# Patient Record
Sex: FEMALE | Race: WHITE | Employment: FULL TIME | URBAN - METROPOLITAN AREA
[De-identification: names, ages, dates, MRNs, and addresses within clinical notes are randomized per-mention and may not be internally consistent; named-entity substitution may affect disease eponyms.]

---

## 2017-01-11 ENCOUNTER — OFFICE VISIT (OUTPATIENT)
Dept: OBGYN | Facility: CLINIC | Age: 32
End: 2017-01-11
Attending: ADVANCED PRACTICE MIDWIFE
Payer: COMMERCIAL

## 2017-01-11 VITALS
HEIGHT: 62 IN | BODY MASS INDEX: 30.18 KG/M2 | SYSTOLIC BLOOD PRESSURE: 97 MMHG | WEIGHT: 164 LBS | DIASTOLIC BLOOD PRESSURE: 61 MMHG

## 2017-01-11 DIAGNOSIS — Z34.03 FIRST PREGNANCY, THIRD TRIMESTER: Primary | ICD-10-CM

## 2017-01-11 PROCEDURE — 99211 OFF/OP EST MAY X REQ PHY/QHP: CPT | Mod: ZF

## 2017-01-11 ASSESSMENT — PAIN SCALES - GENERAL: PAINLEVEL: NO PAIN (0)

## 2017-01-11 NOTE — PROGRESS NOTES
"Subjective:      31 year old  at 34w3d presentst for a routine prenatal appointment. Reports feeling well. + FM.   Occasional tightness and BH contractions, denies vaginal bleeding, LOF, or change in discharge.   Reviewed EOB labs with patient.  Encouraged to continue iron-rich diet.    Objective:  Filed Vitals:    17 1549   BP: 97/61   Height: 1.575 m (5' 2.01\")   Weight: 74.39 kg (164 lb)   , see ob flowsheet  Assessment/Plan     Encounter Diagnosis   Name Primary?     First pregnancy, third trimester Yes      ABO   Date Value Ref Range Status   2016 B  Final     RH(D)   Date Value Ref Range Status   2016  Pos  Final     ANTIBODY SCREEN   Date Value Ref Range Status   2016 Neg  Final     - Reviewed why/how to contact provider if headache/visual changes/RUQ or epigastric pain, decreased fetal movement, vaginal bleeding, leakage of fluid or more than 4 contractions in an hour.  - PTL s/s reviewed, when to call   - Reviewed GBS screening at 35-36 wks.  - Return to clinic in 2 weeks and prn if questions or concerns.   "

## 2017-01-11 NOTE — NURSING NOTE
Chief Complaint   Patient presents with     Prenatal Care   34.3 weeks  Contractions and pressure this week.

## 2017-01-11 NOTE — Clinical Note
"2017       RE: Evelyn Strauss  [address] [facility] [address]     Dear Colleague,    Thank you for referring your patient, Evelyn Strauss, to the [facility] at [facility]. Please see a copy of my visit note below.    Subjective:      31 year old  at 34w3d presentst for a routine prenatal appointment. Reports feeling well. + FM.   Occasional tightness and BH contractions, denies vaginal bleeding, LOF, or change in discharge.   Reviewed EOB labs with patient.  Encouraged to continue iron-rich diet.    Objective:  Filed Vitals:    17 1549   BP: 97/61   Height: 1.575 m (5' 2.01\")   Weight: 74.39 kg (164 lb)   , see ob flowsheet  Assessment/Plan     Encounter Diagnosis   Name Primary?     First pregnancy, third trimester Yes      ABO   Date Value Ref Range Status   2016 B  Final     RH(D)   Date Value Ref Range Status   2016  Pos  Final     ANTIBODY SCREEN   Date Value Ref Range Status   2016 Neg  Final     - Reviewed why/how to contact provider if headache/visual changes/RUQ or epigastric pain, decreased fetal movement, vaginal bleeding, leakage of fluid or more than 4 contractions in an hour.  - PTL s/s reviewed, when to call   - Reviewed GBS screening at 35-36 wks.  - Return to clinic in 2 weeks and prn if questions or concerns.     Again, thank you for allowing me to participate in the care of your patient.      Sincerely,    SOHAIL Kumar CNM      "

## 2017-01-23 ENCOUNTER — ANESTHESIA (OUTPATIENT)
Dept: OBGYN | Facility: CLINIC | Age: 32
End: 2017-01-23
Payer: COMMERCIAL

## 2017-01-23 ENCOUNTER — HOSPITAL ENCOUNTER (INPATIENT)
Facility: CLINIC | Age: 32
LOS: 2 days | Discharge: HOME HEALTH | End: 2017-01-25
Attending: ADVANCED PRACTICE MIDWIFE | Admitting: ADVANCED PRACTICE MIDWIFE
Payer: COMMERCIAL

## 2017-01-23 ENCOUNTER — ANESTHESIA EVENT (OUTPATIENT)
Dept: OBGYN | Facility: CLINIC | Age: 32
End: 2017-01-23
Payer: COMMERCIAL

## 2017-01-23 DIAGNOSIS — O92.79 LACTATION DISORDER, DELIVERED: ICD-10-CM

## 2017-01-23 PROBLEM — O42.919 PRETERM PREMATURE RUPTURE OF MEMBRANES (PPROM) WITH UNKNOWN ONSET OF LABOR: Status: ACTIVE | Noted: 2017-01-23

## 2017-01-23 LAB
ABO + RH BLD: NORMAL
ABO + RH BLD: NORMAL
SPECIMEN EXP DATE BLD: NORMAL
T PALLIDUM IGG+IGM SER QL: NEGATIVE

## 2017-01-23 PROCEDURE — 25800025 ZZH RX 258: Performed by: ADVANCED PRACTICE MIDWIFE

## 2017-01-23 PROCEDURE — 88307 TISSUE EXAM BY PATHOLOGIST: CPT | Mod: 26 | Performed by: ADVANCED PRACTICE MIDWIFE

## 2017-01-23 PROCEDURE — 86901 BLOOD TYPING SEROLOGIC RH(D): CPT | Performed by: ADVANCED PRACTICE MIDWIFE

## 2017-01-23 PROCEDURE — 00HU33Z INSERTION OF INFUSION DEVICE INTO SPINAL CANAL, PERCUTANEOUS APPROACH: ICD-10-PCS | Performed by: ANESTHESIOLOGY

## 2017-01-23 PROCEDURE — 3E0P7GC INTRODUCTION OF OTHER THERAPEUTIC SUBSTANCE INTO FEMALE REPRODUCTIVE, VIA NATURAL OR ARTIFICIAL OPENING: ICD-10-PCS | Performed by: ADVANCED PRACTICE MIDWIFE

## 2017-01-23 PROCEDURE — 36415 COLL VENOUS BLD VENIPUNCTURE: CPT | Performed by: ADVANCED PRACTICE MIDWIFE

## 2017-01-23 PROCEDURE — 25000125 ZZHC RX 250: Performed by: ANESTHESIOLOGY

## 2017-01-23 PROCEDURE — 25000125 ZZHC RX 250

## 2017-01-23 PROCEDURE — 25800025 ZZH RX 258: Performed by: ANESTHESIOLOGY

## 2017-01-23 PROCEDURE — 87653 STREP B DNA AMP PROBE: CPT | Performed by: ADVANCED PRACTICE MIDWIFE

## 2017-01-23 PROCEDURE — 88307 TISSUE EXAM BY PATHOLOGIST: CPT | Performed by: ADVANCED PRACTICE MIDWIFE

## 2017-01-23 PROCEDURE — 86900 BLOOD TYPING SEROLOGIC ABO: CPT | Performed by: ADVANCED PRACTICE MIDWIFE

## 2017-01-23 PROCEDURE — 12000032 ZZH R&B OB CRITICAL UMMC

## 2017-01-23 PROCEDURE — 99215 OFFICE O/P EST HI 40 MIN: CPT

## 2017-01-23 PROCEDURE — 25000125 ZZHC RX 250: Performed by: ADVANCED PRACTICE MIDWIFE

## 2017-01-23 PROCEDURE — 3E0R3CZ INTRODUCTION OF REGIONAL ANESTHETIC INTO SPINAL CANAL, PERCUTANEOUS APPROACH: ICD-10-PCS | Performed by: ANESTHESIOLOGY

## 2017-01-23 PROCEDURE — 86780 TREPONEMA PALLIDUM: CPT | Performed by: ADVANCED PRACTICE MIDWIFE

## 2017-01-23 PROCEDURE — 25000128 H RX IP 250 OP 636: Performed by: ADVANCED PRACTICE MIDWIFE

## 2017-01-23 PROCEDURE — 25900017 H RX MED GY IP 259 OP 259 PS 637: Performed by: ADVANCED PRACTICE MIDWIFE

## 2017-01-23 PROCEDURE — 40000977 ZZH STATISTIC ATTENDANCE AT DELIVERY

## 2017-01-23 RX ORDER — PENICILLIN G POTASSIUM 5000000 [IU]/1
5 INJECTION, POWDER, FOR SOLUTION INTRAMUSCULAR; INTRAVENOUS ONCE
Status: COMPLETED | OUTPATIENT
Start: 2017-01-23 | End: 2017-01-23

## 2017-01-23 RX ORDER — CARBOPROST TROMETHAMINE 250 UG/ML
250 INJECTION, SOLUTION INTRAMUSCULAR
Status: DISCONTINUED | OUTPATIENT
Start: 2017-01-23 | End: 2017-01-24

## 2017-01-23 RX ORDER — OXYTOCIN 10 [USP'U]/ML
10 INJECTION, SOLUTION INTRAMUSCULAR; INTRAVENOUS
Status: DISCONTINUED | OUTPATIENT
Start: 2017-01-23 | End: 2017-01-24

## 2017-01-23 RX ORDER — MISOPROSTOL 100 UG/1
25 TABLET ORAL
Status: DISCONTINUED | OUTPATIENT
Start: 2017-01-23 | End: 2017-01-23

## 2017-01-23 RX ORDER — ACETAMINOPHEN 325 MG/1
650 TABLET ORAL EVERY 4 HOURS PRN
Status: DISCONTINUED | OUTPATIENT
Start: 2017-01-23 | End: 2017-01-24

## 2017-01-23 RX ORDER — OXYTOCIN/0.9 % SODIUM CHLORIDE 30/500 ML
100-340 PLASTIC BAG, INJECTION (ML) INTRAVENOUS CONTINUOUS PRN
Status: COMPLETED | OUTPATIENT
Start: 2017-01-23 | End: 2017-01-23

## 2017-01-23 RX ORDER — EPHEDRINE SULFATE 50 MG/ML
5 INJECTION, SOLUTION INTRAMUSCULAR; INTRAVENOUS; SUBCUTANEOUS
Status: DISCONTINUED | OUTPATIENT
Start: 2017-01-23 | End: 2017-01-24

## 2017-01-23 RX ORDER — NALBUPHINE HYDROCHLORIDE 10 MG/ML
2.5-5 INJECTION, SOLUTION INTRAMUSCULAR; INTRAVENOUS; SUBCUTANEOUS EVERY 6 HOURS PRN
Status: DISCONTINUED | OUTPATIENT
Start: 2017-01-23 | End: 2017-01-24

## 2017-01-23 RX ORDER — OXYCODONE AND ACETAMINOPHEN 5; 325 MG/1; MG/1
1 TABLET ORAL
Status: DISCONTINUED | OUTPATIENT
Start: 2017-01-23 | End: 2017-01-24

## 2017-01-23 RX ORDER — OXYTOCIN/0.9 % SODIUM CHLORIDE 30/500 ML
1-24 PLASTIC BAG, INJECTION (ML) INTRAVENOUS CONTINUOUS
Status: DISCONTINUED | OUTPATIENT
Start: 2017-01-23 | End: 2017-01-24

## 2017-01-23 RX ORDER — TERBUTALINE SULFATE 1 MG/ML
0.25 INJECTION, SOLUTION SUBCUTANEOUS
Status: DISCONTINUED | OUTPATIENT
Start: 2017-01-23 | End: 2017-01-24

## 2017-01-23 RX ORDER — OXYTOCIN/0.9 % SODIUM CHLORIDE 30/500 ML
PLASTIC BAG, INJECTION (ML) INTRAVENOUS
Status: COMPLETED
Start: 2017-01-23 | End: 2017-01-23

## 2017-01-23 RX ORDER — NALOXONE HYDROCHLORIDE 0.4 MG/ML
.1-.4 INJECTION, SOLUTION INTRAMUSCULAR; INTRAVENOUS; SUBCUTANEOUS
Status: DISCONTINUED | OUTPATIENT
Start: 2017-01-23 | End: 2017-01-24

## 2017-01-23 RX ORDER — IBUPROFEN 800 MG/1
800 TABLET, FILM COATED ORAL
Status: COMPLETED | OUTPATIENT
Start: 2017-01-23 | End: 2017-01-24

## 2017-01-23 RX ORDER — OXYTOCIN 10 [USP'U]/ML
INJECTION, SOLUTION INTRAMUSCULAR; INTRAVENOUS
Status: DISCONTINUED
Start: 2017-01-23 | End: 2017-01-24 | Stop reason: WASHOUT

## 2017-01-23 RX ORDER — ONDANSETRON 2 MG/ML
4 INJECTION INTRAMUSCULAR; INTRAVENOUS EVERY 6 HOURS PRN
Status: DISCONTINUED | OUTPATIENT
Start: 2017-01-23 | End: 2017-01-24

## 2017-01-23 RX ORDER — LIDOCAINE HYDROCHLORIDE AND EPINEPHRINE 15; 5 MG/ML; UG/ML
INJECTION, SOLUTION EPIDURAL PRN
Status: DISCONTINUED | OUTPATIENT
Start: 2017-01-23 | End: 2017-01-24 | Stop reason: HOSPADM

## 2017-01-23 RX ORDER — SODIUM CHLORIDE, SODIUM LACTATE, POTASSIUM CHLORIDE, CALCIUM CHLORIDE 600; 310; 30; 20 MG/100ML; MG/100ML; MG/100ML; MG/100ML
INJECTION, SOLUTION INTRAVENOUS CONTINUOUS
Status: DISCONTINUED | OUTPATIENT
Start: 2017-01-23 | End: 2017-01-24

## 2017-01-23 RX ORDER — METHYLERGONOVINE MALEATE 0.2 MG/ML
200 INJECTION INTRAVENOUS
Status: DISCONTINUED | OUTPATIENT
Start: 2017-01-23 | End: 2017-01-24

## 2017-01-23 RX ADMIN — Medication 25 MCG: at 13:34

## 2017-01-23 RX ADMIN — OXYTOCIN-SODIUM CHLORIDE 0.9% IV SOLN 30 UNIT/500ML 2 MILLI-UNITS/MIN: 30-0.9/5 SOLUTION at 20:05

## 2017-01-23 RX ADMIN — Medication 10 ML: at 18:58

## 2017-01-23 RX ADMIN — OXYTOCIN-SODIUM CHLORIDE 0.9% IV SOLN 30 UNIT/500ML 2 MILLI-UNITS/MIN: 30-0.9/5 SOLUTION at 22:50

## 2017-01-23 RX ADMIN — SODIUM CHLORIDE, POTASSIUM CHLORIDE, SODIUM LACTATE AND CALCIUM CHLORIDE: 600; 310; 30; 20 INJECTION, SOLUTION INTRAVENOUS at 16:40

## 2017-01-23 RX ADMIN — Medication 2.5 MILLION UNITS: at 20:48

## 2017-01-23 RX ADMIN — SODIUM CHLORIDE, POTASSIUM CHLORIDE, SODIUM LACTATE AND CALCIUM CHLORIDE: 600; 310; 30; 20 INJECTION, SOLUTION INTRAVENOUS at 22:40

## 2017-01-23 RX ADMIN — OXYTOCIN-SODIUM CHLORIDE 0.9% IV SOLN 30 UNIT/500ML 340 ML/HR: 30-0.9/5 SOLUTION at 23:56

## 2017-01-23 RX ADMIN — SODIUM CHLORIDE, POTASSIUM CHLORIDE, SODIUM LACTATE AND CALCIUM CHLORIDE: 600; 310; 30; 20 INJECTION, SOLUTION INTRAVENOUS at 18:46

## 2017-01-23 RX ADMIN — PENICILLIN G POTASSIUM 5 MILLION UNITS: 5000000 POWDER, FOR SOLUTION INTRAMUSCULAR; INTRAPLEURAL; INTRATHECAL; INTRAVENOUS at 16:40

## 2017-01-23 RX ADMIN — Medication 25 MCG: at 16:07

## 2017-01-23 RX ADMIN — SODIUM CHLORIDE, POTASSIUM CHLORIDE, SODIUM LACTATE AND CALCIUM CHLORIDE 1000 ML: 600; 310; 30; 20 INJECTION, SOLUTION INTRAVENOUS at 18:05

## 2017-01-23 RX ADMIN — LIDOCAINE HYDROCHLORIDE,EPINEPHRINE BITARTRATE 3 ML: 15; .005 INJECTION, SOLUTION EPIDURAL; INFILTRATION; INTRACAUDAL; PERINEURAL at 18:50

## 2017-01-23 NOTE — IP AVS SNAPSHOT
MRN:[id number]                      After Visit Summary   1/23/2017    Evelyn Strauss    MRN: [id number]           Thank you!     Thank you for choosing [facility] for your care. Our goal is always to provide you with excellent care. Hearing back from our patients is one way we can continue to improve our services. Please take a few minutes to complete the written survey that you may receive in the mail after you visit with us. Thank you!        Patient Information     Date Of Birth          1985        About your hospital stay     You were admitted on:  January 23, 2017 [facility]    You were discharged on:  January 25, 2017       Who to Call     For medical emergencies, please call 911.  For non-urgent questions about your medical care, please call your primary care provider or clinic, None          Attending Provider     Provider    [place]ar[place], SOHAIL Sandoval CNM, Karen Denise, APRN CNM       Primary Care Provider Fax #    [facility] [phone]       [address] [facility] [address]        Your next 10 appointments already scheduled     Mar 06, 2017 11:00 AM   RETURN EXTENDED with SOHAIL Purdy CNM   [facility] ([facility])    [facility] [address]  [address] [facility] [address] [phone]              Further instructions from your care team       Postpartum Vaginal Delivery Instructions    Activity       Ask family and friends for help when you need it.    Do not place anything in your vagina for 6 weeks.    You are not restricted on other activities, but take it easy for a few weeks to allow your body to recover from delivery.  You are able to do any activities you feel up to that point.    No driving until you have stopped taking your pain medications (usually two weeks after delivery).     Call your health care provider if you have  any of these symptoms:       Increased pain, swelling, redness, or fluid around your stiches from an episiotomy or perineal tear.    A fever above 100.4 F (38 C) with or without chills when placing a thermometer under your tongue.    You soak a sanitary pad with blood within 1 hour, or you see blood clots larger than a golf ball.    Bleeding that lasts more than 6 weeks.    Vaginal discharge that smells bad.    Severe pain, cramping or tenderness in your lower belly area.    A need to urinate more frequently (use the toilet more often), more urgently (use the toilet very quickly), or it burns when you urinate.    Nausea and vomiting.    Redness, swelling or pain around a vein in your leg.    Problems breastfeeding or a red or painful area on your breast.    Chest pain and cough or are gasping for air.    Problems coping with sadness, anxiety, or depression.  If you have any concerns about hurting yourself or the baby, call your provider immediately.     You have questions or concerns after you return home.     Keep your hands clean:  Always wash your hands before touching your perineal area and stitches.  This helps reduce your risk of infection.  If your hands aren't dirty, you may use an alcohol hand-rub to clean your hands. Keep your nails clean and short.        Pending Results     Date and Time Order Name Status Description    1/24/2017 0101 Placenta path order and indications In process             Admission Information        Provider Department Dept Phone    1/23/2017 SOHAIL Batista CNM [facility] [phone]      Your Vitals Were     Blood Pressure Pulse Temperature Respirations Pulse Oximetry Last Period    104/76 mmHg 78 97.9  F (36.6  C) (Oral) 14 97% 05/15/2016      [employer] Information     [employer] gives you secure access to your electronic health record. If you see a primary care provider, you can also send messages to your care team and make appointments. If you have questions, please call your  primary care clinic.  If you do not have a primary care provider, please call [phone] and they will assist you.        Care EveryWhere ID     This is your Care EveryWhere ID. This could be used by other organizations to access your [facility] medical records  [id number]           Review of your medicines      UNREVIEWED medicines. Ask your doctor about these medicines        Dose / Directions    PRENATAL DHA PO   Used for:  First trimester pregnancy        Dose:  1 tablet   Take 1 tablet by mouth daily   Refills:  0         START taking        Dose / Directions    breast pump Misc   Used for:  Lactation disorder, delivered        Dose:  1 each   1 each daily as needed   Quantity:  1 each   Refills:  0       docusate sodium 100 MG tablet   Commonly known as:  COLACE   Used for:  Vaginal delivery        Dose:  100 mg   Take 100 mg by mouth daily   Quantity:  100 tablet   Refills:  1       ferrous fumarate 65 mg ([place]. FE)-Vitamin C 125 mg  MG Tabs tablet   Commonly known as:  VITRON C   Used for:  Postpartum anemia        Dose:  1 tablet   Take 1 tablet by mouth daily   Quantity:  100 tablet   Refills:  1         CONTINUE these medicines which have NOT CHANGED        Dose / Directions    VITAMIN D (CHOLECALCIFEROL) PO        Take by mouth daily   Refills:  0            Where to get your medicines      These medications were sent to [id number] - [place], [place] - [address] [place] [address]     Phone:  [phone]    - docusate sodium 100 MG tablet  - ferrous fumarate 65 mg ([place]. FE)-Vitamin C 125 mg  MG Tabs tablet      Some of these will need a paper prescription and others can be bought over the counter. Ask your nurse if you have questions.     Bring a paper prescription for each of these medications    - breast pump Misc             Protect others around you: Learn how to safely use, store and throw away your medicines at www.disposemymeds.org.              Medication List: This is a list of all your medications and when to take them. Check marks below indicate your daily home schedule. Keep this list as a reference.      Medications           Morning Afternoon Evening Bedtime As Needed    breast pump Misc   1 each daily as needed                                docusate sodium 100 MG tablet   Commonly known as:  COLACE   Take 100 mg by mouth daily                                ferrous fumarate 65 mg ([place]. FE)-Vitamin C 125 mg  MG Tabs tablet   Commonly known as:  VITRON C   Take 1 tablet by mouth daily                                PRENATAL DHA PO   Take 1 tablet by mouth daily                                VITAMIN D (CHOLECALCIFEROL) PO   Take by mouth daily

## 2017-01-23 NOTE — H&P
"ADMIT NOTE  =================  36w1d    Evelyn Strauss is a 31 year old female with an Patient's last menstrual period was 05/15/2016. and Estimated Date of Delivery: 2017 [facility] at 12:00 PM with with PPROM without labor.  PPROM at 0615am today    HPI  ================    Contractions- mild  Fetal movement- active  ROM- yes, copious, clear and pink-tinged   Vaginal bleeding- pink  GBS- not done  FOB- is involved, at bedside  Other labor support-     Weight gain- 164 - 125 lbs, Total weight gain- 39 lbs  Height- 62\"  BMI- 22.9  First prenatal visit at 9 weeks, Total visits- 8    PROBLEM LIST  =================  Patient Active Problem List    Diagnosis Date Noted      premature rupture of membranes (PPROM) with unknown onset of labor 2017     Priority: Medium     Anemia during pregnancy in third trimester 2016     Priority: Medium     2016 - 10.4 with 3rd trimester labs, re screened today after increasing iron rich foods - up to 11.2.  Continue iron rich diet.  Recheck on admit to l&D       First pregnancy 2016     Priority: Medium     , MERY 17- parvo IgG pos, IgM neg  Varicella IgG pos       Vitamin D deficiency RESOLVED 2016     Priority: Medium     Resolved   16- Vit d 19, start 5000IU qd         HISTORIES  ============  No Known Allergies  Past Medical History   Diagnosis Date     NO ACTIVE PROBLEMS      Past Surgical History   Procedure Laterality Date     Extraction(s) dental     .  Family History   Problem Relation Age of Onset     Hypertension Mother      DIABETES Maternal Grandmother      Alzheimer Disease Paternal Grandmother      Depression Mother      Depression Sister      Thyroid Disease Mother      Obesity Mother      Obesity Sister      Social History   Substance Use Topics     Smoking status: Never Smoker      Smokeless tobacco: Never Used     Alcohol Use: 0.0 oz/week     0 Standard drinks or " equivalent per week      Comment: 2 a month tryong to conceive soon. stopped in preganncy     Obstetric History       T0      TAB0   SAB0   E0   M0   L0       # Outcome Date GA Lbr Devan/2nd Weight Sex Delivery Anes PTL Lv   1 Current                    LABS:   ===========  Prenatal Labs:  Rhogam not indicated   Lab Results   Component Value Date    ABO B 2016    RH  Pos 2016    AS Neg 2016    HEPBANG Nonreactive 2016    TREPAB Negative 2016    HGB 11.2* 2016     Rubella immune  No results found for this basename: GBS  Other labs:  No results found for this or any previous visit (from the past 24 hour(s)).    ROS  =========  Pt denies significant respiratory, cardiovacular, GI, or muscular/skeletalcomplaints.    See RN data base ROS.       PHYSICAL EXAM:  ===============  /63 mmHg  Temp(Src) 98  F (36.7  C) (Oral)  Resp 16  LMP 05/15/2016  General appearance: comfortable  GENERAL APPEARANCE: healthy, alert and no distress  RESP: lungs clear to auscultation - no rales, rhonchi or wheezes  BREAST: normal without masses, tenderness or nipple discharge and no palpable axillary masses or adenopathy  CV: regular rates and rhythm, normal S1 S2, no S3 or S4 and no murmur,and no varicosities  ABDOMEN:  soft, nontender, no epigastric pain  SKIN: no suspicious lesions or rashes  NEURO: Denies headache, blurred vision, other vision changes  PSYCH: mentation appears normal. and affect normal/bright  Legs: Reflexes normal bilaterally     Abdomen: gravid, vertex fetus per Leopold's, non-tender between contractions.   Cephalic presentation confirmed by BSUS  EFW-  6 lbs.   CONTACTIONS: mild and every 8-12 minutes  FETAL HEART TONES: continuous EFM- baseline 130 with moderate variability and positive accelerations. No decelerations.  PELVIC EXAM: 1/ 50%/ Posterior/ soft/ -1 . GBS swab obtained  PAVON SCORE: 5  BLOODY SHOW: no   ROM:yes, copious, clear  FLUID: clear  AMNISURE:  not done    ASSESSMENT:  ==============  IUP @ 36w1d admitted PPROM without labor   NST NOT DONE  Fetal Heart Rate - category one  GBS- pending     PLAN:  ===========  Admit - see IP orders  cervical ripening with Misoprostol oral reviewed with pt. Agreeable to plan, will start with one dose.  Labor induction with Pitocin reviewed with pt. Agreeable to plan, will start pitocin after 1-2 miso  Prophylactic IV antibiotic for positive GBS status reviewed with pt. Agreeable to PCN.  MD consultant on call Geronimo/ brianna felixn  Ambulation, hydration, position changes, birthing ball and tub options to facilitate labor reviewed with pt .  Ruby Novak, APRN CNM

## 2017-01-23 NOTE — IP AVS SNAPSHOT
[facility]    [address] [facility] [address]    Phone:  [phone]                                       After Visit Summary   1/23/2017    Evelyn Strauss    MRN: [id number]           After Visit Summary Signature Page     I have received my discharge instructions, and my questions have been answered. I have discussed any challenges I see with this plan with the nurse or doctor.    ..........................................................................................................................................  Patient/Patient Representative Signature      ..........................................................................................................................................  Patient Representative Print Name and Relationship to Patient    ..................................................               ................................................  Date                                            Time    ..........................................................................................................................................  Reviewed by Signature/Title    ...................................................              ..............................................  Date                                                            Time

## 2017-01-24 LAB
GP B STREP DNA SPEC QL NAA+PROBE: NORMAL
SPECIMEN SOURCE: NORMAL

## 2017-01-24 PROCEDURE — 0KQM0ZZ REPAIR PERINEUM MUSCLE, OPEN APPROACH: ICD-10-PCS | Performed by: ADVANCED PRACTICE MIDWIFE

## 2017-01-24 PROCEDURE — 25000132 ZZH RX MED GY IP 250 OP 250 PS 637: Performed by: ADVANCED PRACTICE MIDWIFE

## 2017-01-24 PROCEDURE — 12000030 ZZH R&B OB INTERMEDIATE UMMC

## 2017-01-24 PROCEDURE — 72200001 ZZH LABOR CARE VAGINAL DELIVERY SINGLE

## 2017-01-24 PROCEDURE — 0UQMXZZ REPAIR VULVA, EXTERNAL APPROACH: ICD-10-PCS | Performed by: ADVANCED PRACTICE MIDWIFE

## 2017-01-24 PROCEDURE — 25000125 ZZHC RX 250: Performed by: ADVANCED PRACTICE MIDWIFE

## 2017-01-24 RX ORDER — HYDROCORTISONE 2.5 %
CREAM (GRAM) TOPICAL 3 TIMES DAILY PRN
Status: DISCONTINUED | OUTPATIENT
Start: 2017-01-24 | End: 2017-01-25 | Stop reason: HOSPADM

## 2017-01-24 RX ORDER — OXYTOCIN 10 [USP'U]/ML
10 INJECTION, SOLUTION INTRAMUSCULAR; INTRAVENOUS
Status: DISCONTINUED | OUTPATIENT
Start: 2017-01-24 | End: 2017-01-25 | Stop reason: HOSPADM

## 2017-01-24 RX ORDER — OXYTOCIN/0.9 % SODIUM CHLORIDE 30/500 ML
340 PLASTIC BAG, INJECTION (ML) INTRAVENOUS CONTINUOUS PRN
Status: DISCONTINUED | OUTPATIENT
Start: 2017-01-24 | End: 2017-01-25 | Stop reason: HOSPADM

## 2017-01-24 RX ORDER — NALOXONE HYDROCHLORIDE 0.4 MG/ML
.1-.4 INJECTION, SOLUTION INTRAMUSCULAR; INTRAVENOUS; SUBCUTANEOUS
Status: DISCONTINUED | OUTPATIENT
Start: 2017-01-24 | End: 2017-01-25 | Stop reason: HOSPADM

## 2017-01-24 RX ORDER — ACETAMINOPHEN 325 MG/1
650 TABLET ORAL EVERY 4 HOURS PRN
Status: DISCONTINUED | OUTPATIENT
Start: 2017-01-24 | End: 2017-01-25 | Stop reason: HOSPADM

## 2017-01-24 RX ORDER — LANOLIN 100 %
OINTMENT (GRAM) TOPICAL
Status: DISCONTINUED | OUTPATIENT
Start: 2017-01-24 | End: 2017-01-25 | Stop reason: HOSPADM

## 2017-01-24 RX ORDER — BISACODYL 10 MG
10 SUPPOSITORY, RECTAL RECTAL DAILY PRN
Status: DISCONTINUED | OUTPATIENT
Start: 2017-01-26 | End: 2017-01-25 | Stop reason: HOSPADM

## 2017-01-24 RX ORDER — AMOXICILLIN 250 MG
1-2 CAPSULE ORAL 2 TIMES DAILY
Status: DISCONTINUED | OUTPATIENT
Start: 2017-01-24 | End: 2017-01-25 | Stop reason: HOSPADM

## 2017-01-24 RX ORDER — MISOPROSTOL 200 UG/1
400 TABLET ORAL
Status: DISCONTINUED | OUTPATIENT
Start: 2017-01-24 | End: 2017-01-25 | Stop reason: HOSPADM

## 2017-01-24 RX ORDER — OXYTOCIN/0.9 % SODIUM CHLORIDE 30/500 ML
100 PLASTIC BAG, INJECTION (ML) INTRAVENOUS CONTINUOUS
Status: DISCONTINUED | OUTPATIENT
Start: 2017-01-24 | End: 2017-01-25 | Stop reason: HOSPADM

## 2017-01-24 RX ORDER — IBUPROFEN 400 MG/1
400-800 TABLET, FILM COATED ORAL EVERY 6 HOURS PRN
Status: DISCONTINUED | OUTPATIENT
Start: 2017-01-24 | End: 2017-01-25 | Stop reason: HOSPADM

## 2017-01-24 RX ADMIN — IBUPROFEN 800 MG: 400 TABLET ORAL at 08:04

## 2017-01-24 RX ADMIN — LIDOCAINE HYDROCHLORIDE 10 ML: 10 INJECTION, SOLUTION INFILTRATION; PERINEURAL at 00:23

## 2017-01-24 RX ADMIN — SENNOSIDES AND DOCUSATE SODIUM 2 TABLET: 8.6; 5 TABLET ORAL at 08:05

## 2017-01-24 RX ADMIN — IBUPROFEN 800 MG: 400 TABLET ORAL at 21:55

## 2017-01-24 RX ADMIN — SENNOSIDES AND DOCUSATE SODIUM 2 TABLET: 8.6; 5 TABLET ORAL at 20:57

## 2017-01-24 RX ADMIN — IBUPROFEN 800 MG: 400 TABLET ORAL at 15:30

## 2017-01-24 RX ADMIN — IBUPROFEN 800 MG: 800 TABLET, FILM COATED ORAL at 00:57

## 2017-01-24 NOTE — PROGRESS NOTES
Labor progress note    S: CNM called to room to assess FHR tracing by RN.  Pt noted having some increase in pelvic pressure, had just given herself an PCEA bolus. FOB at bedside, supportive.  Reivewed with pt and FOB recommendation for SVE to assess labor progress given FHR category 2, consented.     O:  Blood pressure 108/59, pulse 98, temperature 98.5  F (36.9  C), temperature source Oral, resp. rate 16, last menstrual period 05/15/2016, SpO2 98 %.  General appearance: comfortable but feeling more rectal presure.  Contractions: Every 1-4 minutes. 60-90 seconds duration.  Palpate: moderate.  Soft resting tone.   FHT: Baseline 150 with moderate variability. Accelerations not present. + prolonged decelerations present - resolve with maternal reposition, 02@10L, Pitocin off, LR bolus.  ROM: clear fluid. Membranes have been ruptured for 15 hours.  Pelvic exam: 9.5/ 100%/ Anterior/ soft/ +1.   Moderate bloody show on peripad.       Pitocin- none,  Antibiotics- PCN  Epidural in situ  LR bolus  02@10L via facemask    A:  31 year old  with IUP @ 36w1d PPROM <18 hours, now active labor   Fetal Heart rate tracing Category category two  GBS- unknown.   Patient Active Problem List   Diagnosis     First pregnancy     Vitamin D deficiency RESOLVED     Anemia during pregnancy in third trimester      premature rupture of membranes (PPROM) with unknown onset of labor         P:  - ST now, room prepared for delivery.   - Maternal position to facilitate fetal rotation/descent and maximize fetal oxygenation.    - Reviewed second stage with pt and FOB.  Reviewed NICU prn based on initial  assessment   - Anticipate   - MD consultant on call French/ available prn       Gris SAUCEDA CNM

## 2017-01-24 NOTE — PROGRESS NOTES
Post Partum Note  SIGNIFICANT PROBLEMS:  Patient Active Problem List    Diagnosis Date Noted     Vaginal delivery 2017     Priority: Medium      premature rupture of membranes (PPROM) with unknown onset of labor 2017     Priority: Medium     Anemia during pregnancy in third trimester 2016     Priority: Medium     2016 - 10.4 with 3rd trimester labs, re screened today after increasing iron rich foods - up to 11.2.  Continue iron rich diet.  Recheck on admit to l&D       First pregnancy 2016     Priority: Medium     , MERY 17- parvo IgG pos, IgM neg  Varicella IgG pos       Vitamin D deficiency RESOLVED 2016     Priority: Medium     Resolved   16- Vit d 19, start 5000IU qd         INTERVAL HISTORY:  BP 92/66 mmHg  Pulse 82  Temp(Src) 98.5  F (36.9  C) (Oral)  Resp 14  SpO2 97%  LMP 05/15/2016  Breastfeeding? Unknown  Pt stable, baby is rooming in. Baby girl, have not yet picked name.  Breast feeding status:initiated; baby sleepy today but latching   Complications since 2 hours post delivery: None  Patient is tolerating activity well, Voiding without difficulty, cramping is minimal, lochia is decreasing and patient denies clots.  Perineal pain is is minimal.  The perineum laceration is well approximated    Postpartum hemoglobin   HEMOGLOBIN   Date Value Ref Range Status   2016 11.2* 11.7 - 15.7 g/dL Final     Blood type ABO        B   2017  RH      Pos   2017  Rubella status Immune    ASSESSMENT/PLAN:    Normal postpartum exam   Breasts: soft, nontender  Nipples:intact, without lesion  Fundus: Firm @ umbilicus, midline, nontender  Abdomen: soft, +bs  Perineum: 2nd degree laceration and bilateral lab lac healing, well approximated, no edema, bruising or hematoma  Lochia: moderate rubra, no clots  Legs: trace edema    Normal postpartum exam , Stable Post-partum day #1  Complications:none    Postpartum warning s/s reviewed, including  bleeding/clots, fever, mastitis, or depression, Kegels/ crunches  Continue prenatal vitamins.  Birthcontrol planned:Condoms    Declines any discharge medication.  Plan d/c home tomorrow. Home Visit Ordered- N/A  RTC 6 weeks    Current Discharge Medication List      CONTINUE these medications which have NOT CHANGED    Details   VITAMIN D, CHOLECALCIFEROL, PO Take by mouth daily      Docosahexaenoic Acid (PRENATAL DHA PO) Take 1 tablet by mouth daily    Associated Diagnoses: First trimester pregnancy           SOHAIL Brooks, JASWANTM

## 2017-01-24 NOTE — L&D DELIVERY NOTE
Evelyn Strauss is a 31 year old   at  36w1d presented to labor floor with c/o PPROM for clear at 0615.   Agreed to and received induction of labor with PO Misoprostol 25mcg PO x2 doses after admission SVE /-2.  Agreed to and received PCN for unknown GBS.  Requested and received epidural anesthesia at 1830.   Category 2 FHR tracing during day with moderate variability throughout.  Had prolonged deceleration and SVE found Alip/C/+1.   Straight cath by RN for 500ml and pt found to be C/C/+2 at .    R lateral rest with peanut ball after attempt at pushing.  Resumed pushing at  alternating between R lateral and tug of war positioning.    Pushed effectively with CNM coaching. FHR with variable and late decels with pushing effort, periods of tachycardia noted near end of second stage, periods of minimal variability.  No maternal tachycardia, no maternal fever. NICU team requested for delivery.  Head delivered OA and restituted to RYLEE . No nuchal cord. Shoulders easily delivered under maternal effort.  Live female  delivered at 2355 over a perineal laceration under epidural anesthesia.  Spontaneous breath, vigorous cry, well flexed, HR>100. Infant directly to maternal abdomen, skin to skin. Delayed cord clamping for 9 minutes then clamped x2 and cut by FOB.   20 units of pitocin infusing after baby.  Cord blood obtained for typing.  Cord segment for gases.  Intact placenta spontaneously delivered via Miller at 0014.   3 vessel cord. Fundus firm @ U after massage.   Rectum inspected, found to be intact.  2nd degree perineal  laceration repaired with 3-0 vicryl suture on a CT  in the usual fashion.  Bilateral labial lacerations noted oozing despite direct pressure.  Infiltrated with 1% lidocaine and then repaired in usual fashion. Hemostasis noted.    Pt tolerated well.  ST for 600 ml.  Mother and infant stable, continued skin to skin.    Apgars 9/9.  Weight Pending .     Placenta to  pathology.   Cord gases       Complications of pregnancy, labor and delivery: Prematurity (<37 wks) and ROM > 18 hours  Birth attendants  Gris SAUCEDA, SMITHA        Ph Cord Arterial 7.22 7.16 - 7.39 pH Final      PCO2 Cord Arterial 51 35 - 71 mm Hg Final     PO2 Cord Arterial 31 3 - 33 mm Hg Final     Bicarbonate Cord Arterial 21 16 - 24 mmol/L Final     Base Deficit Art 7.0 mmol/L Final     Ph Cord Blood Venous 7.31 7.21 - 7.45 pH Final      PCO2 Cord Venous 46 27 - 57 mm Hg Final     PO2 Cord Venous 18 (L) 21 - 37 mm Hg Final     Bicarbonate Cord Venous 23 16 - 24 mmol/L Final     Base Deficit Venous 3.4 mmol/L Final           Delivery Summary    Evelyn Strauss MRN# [id number]   Age: 31 year old YOB: 1985     Labor Event Times:    Labor Onset Date 2017       Labor Onset Time 5:00 PM    Dilation Complete Date 2017    Dilation Complete Time 9:15 PM       Start Pushing Date         Start Pushing Time             Labor Length:    1st Stage (hrs/min)         2nd Stage (hrs/min)         3rd Stage (hrs/min)             Labor Events:     Labor      Indicator      Rupture Date 2017    Rupture Time 6:15 am    Rupture Type Premature rupture of membranes [6]    Fluid Color Clear    Labor Type Spontaneous    Induction      Induction Indication          Augmentation      Labor Complications      Additional Complications      Management of Labor IV Antibiotics       Antibiotics Penicillin    IV Antibiotic Given Greater than 4 hours prior to delivery    Additional Management      Fetal Status Prior to  Delivery Category 2    Fetal Status Comments          Cervical Ripening:    Date       Time       Type           Delivery:    Episiotomy None    Local Anesthetic    1% lidocaine    Lacerations 2nd;Labial    Sponge Count Correct        Needle Count Correct      Final Count by: SMITHA Guerrier RN    Sutures 3-0 vicryl, 4-0 vicryl    Vaginal delivery est. blood loss (mL     "  Surgical or additional est. blood loss (mL)      Combined est. blood loss (mL):      Packing Intentionally Left In            Information for the patient's :  Jana Strauss [[id number]]       Delivery  2017 11:55 PM by  Vaginal, Spontaneous Delivery  Sex:  female Gestational Age: 36w1d  Delivery Clinician:  Gris Meyer  Living?: Yes          APGARS  One minute Five minutes Ten minutes   Skin color: 1   1        Heart rate: 2   2        Grimace: 2   2        Muscle tone: 2   2        Breathin   2        Totals: 9  9         Presentation/position: Vertex  Middle Occiput Anterior  Resuscitation and Interventions: Method:  None  Oxygen Type:     Intubation Time:   # of Attempts:     ETT Size:        Tracheal Suction:     Tracheal returns:      [place] Care at Delivery:  NICU team called to delivery on behalf of Gris Meyer CNM for a late  infant with decreased heart rate at times. Infant cried immediately after the birth and was placed upon the mother's chest. No further need of NICU team. NNP did not assess   or handle the infant. No charge. To NBN for further management.  Annette Mcarthur RN- CNP, NNP 2017 12:01 AM        Cord information: 3 Vessels   Disposition of cord blood: Lab    Blood gases sent? Yes  Complications: None   Placenta: Delivered: 2017 12:14 AM  Spontaneous    appearance.  Comments: Paul.  Disposition: Pathology  [place] Measurements:  Weight: 6 lb 9 oz (2977 g)  Height: 19.5\"  Head circumference: 33 cm  Chest circumference:     Temperature:     Other providers:       Additional  information:  Forceps:    Verbal Informed Consent Obtained:       Alternative Labor Strategies Discussed:     Emergency Resources Available:       Type:       Accrued Pulling Time:       # of Pulls:      Position:     Fetal Station:       Indications:      Other Indications:     Operative Vaginal Delivery Brief Note Forceps:        Vacuum:    Verbal Informed Consent " Obtained:     Alternative Labor Strategies Discussed:     Emergency Resources Available:     Type:      Accrued Pulling Time:       # of Pop-Offs:       # of Pulls:       Position:     Fetal Station:      Indications for Vacuum:       Other Indications:    Operative Vaginal Delivery Brief Note Vacuum:        Shoulder Dystocia Shoulder Dystocia    Labor Complications:  GBS Positive, Prolonged ROM (>18 hours),  Labor   Fetal Tracing Prior to Delivery:  Category 2   Shoulder dystocia present?:  No                                            Breech:       : Type:     Indications for Primary:     Indications for Secondary:     Other Indications:        Observed anomalies     Output in Delivery Room:

## 2017-01-24 NOTE — PROGRESS NOTES
Blood pressure 121/67, pulse 98, temperature 98.5  F (36.9  C), temperature source Oral, resp. rate 16, last menstrual period 05/15/2016.  General appearance: uncomfortable with contractions  Contx just stronger in the last 45 min. Has been moving, standing, sitting on ball    CONTACTIONS: Contractions every 5-7 minutes.  Palpate: mild and moderate  Pitocin- none,  Antibiotics- PCN  FETAL HEART TONES: baseline 145 with moderate FHR variability and  pos accelerations.  No decelerations present.    ROM: clear fluid  PELVIC EXAM:deferred    ASSESSMENT:  ==============  IUP @ 36w1d PPROM  miso x 2 doses  early labor   Fetal Heart rate tracing Category category one  GBS- pending, Sent this morning     PLAN:  ===========  comfort measures prn   Pain medication , requesting epidural now  Prophylactic antibiotic for + GBS status  Anticipate   Labor induction with miso x 2 doses, plan pitocin IV after epidural prn     Ruby Novak,JASWANTM, APRN

## 2017-01-24 NOTE — ANESTHESIA PROCEDURE NOTES
Epidural Procedure Note    Staff:     Anesthesiologist:  CATARINA ROSALES    Resident/CRNA:  VALERIO MARK    Procedure performed by resident/CRNA in the presence of a teaching physician    Location: OB     Procedure start time:  1/23/2017 6:30 PM     Procedure end time:  1/23/2017 7:00 PM   Pre-procedure checklist:   patient identified, IV checked, site marked, risks and benefits discussed, informed consent, monitors and equipment checked, pre-op evaluation, at physician/surgeon's request and post-op pain management      Correct Patient: Yes      Correct Position: Yes      Correct Site: Yes      Correct Procedure: Yes      Correct Laterality:  Yes    Site Marked:  Yes  Procedure:     Procedure:  Epidural catheter    Position:  Sitting    Sterile Prep: chloraprep      Insertion site:  L3-4    Local skin infiltration:  1% lidocaine    amount (mL):  3    Approach:  Midline    Needle gauge (G):  17    Needle Length (in):  3.5    Block Needle Type:  Touhy    Injection Technique:  LORT saline    CHRISTELLE at (cm):  6    Attempts:  1    Redirects:  0    Catheter gauge (G):  19    Catheter threaded easily: Yes      Threaded to cm at skin:  12    Paresthesias:  No    Aspiration negative for Heme or CSF: Yes      Test dose (mL):  3    Test dose time:  18:50    Test dose negative for signs of intravascular, subdural or intrathecal injection: Yes

## 2017-01-24 NOTE — PLAN OF CARE
Problem: Goal Outcome Summary  Goal: Goal Outcome Summary  Outcome: Improving  Patient is stable but looks so tired and very sleepy. Assisted with breastfeeding but baby was sleepy at breast.  Demonstrated how to hand express and was able to get some colostrum. Was able to teach back breast massage. Will continue will plan of care and assist with breastfeeding.

## 2017-01-24 NOTE — ANESTHESIA PREPROCEDURE EVALUATION
Anesthesia Evaluation       history and physical reviewed .      No history of anesthetic complications     ROS/MED HX    ENT/Pulmonary:  - neg pulmonary ROS     Neurologic:  - neg neurologic ROS     Cardiovascular:  - neg cardiovascular ROS       METS/Exercise Tolerance:     Hematologic:         Musculoskeletal:         GI/Hepatic:  - neg GI/hepatic ROS       Renal/Genitourinary:         Endo:         Psychiatric:         Infectious Disease:         Malignancy:         Other:               Physical Exam  Normal systems: cardiovascular, pulmonary and dental    Airway   Mallampati: II  TM distance: > 3 FB  Neck ROM: full  Mouth opening: > 3 cm    Dental     Cardiovascular       Pulmonary           neg OB ROS  (-) gestational diabetes               Anesthesia Plan      History & Physical Review      ASA Status:  .  OB Epidural Asa: 1            Postoperative Care      Consents  Anesthetic plan, risks, benefits and alternatives discussed with:  Patient..

## 2017-01-24 NOTE — PLAN OF CARE
Problem: Labor (Cervical Ripen, Induct, Augment) (Adult,Obstetrics,Pediatric)  Goal: Signs and Symptoms of Listed Potential Problems Will be Absent or Manageable (Labor)  Signs and symptoms of listed potential problems will be absent or manageable by discharge/transition of care (reference Labor (Cervical Ripen, Induct, Augment) (Adult,Obstetrics,Pediatric) CPG).   Pt c/o pain this shift.  Patient utilized position changes and birthing ball.  Patient requested epidural.  Bolus given and epidural administered by anesthesia resident.  Patient resting comfortably and wanting to sleep.  Report given to Melanie Jimenez RN who assumes care of patient.  Patient in stable conditon.

## 2017-01-24 NOTE — PROGRESS NOTES
Labor progress note    S: Pt noted resting comfortably s/p epidural placement.  FOB at bedside, supportive.  Both pt and FOB verbalize understanding of plan of care previously discussed during day shift of pitocin IOL and continued PCN prophylaxis for GBS unknown and <37 weeks gestation.      O:  Blood pressure 118/69, pulse 98, temperature 98.5  F (36.9  C), temperature source Oral, resp. rate 16, last menstrual period 05/15/2016, SpO2 99 %.  General appearance: comfortable.  Contractions: Every 1-6 minutes. 60- 180 seconds duration.  Palpate: mild and moderate.  Soft resting tone.   FHT: Baseline 145 with moderate variability. Accelerations are present. + decelerations present with tetanic contraction - resolved with end of contraction.  ROM: clear fluid at 0615. Membranes have been ruptured for 13 hours.  Pelvic exam:Defer.   Admission SVE /-2 by off going CNM.       Pitocin- none,  Antibiotics- PCN  Last dose of PO misoprostol #2 given at 1607.   LR at maintenance rate  Epidural in situ    A:  31 year old  with IUP @ 36w1d PPROM <18 hours, afebrile.   Fetal Heart rate tracing Category category two  GBS- Unknown.   Patient Active Problem List   Diagnosis     First pregnancy     Vitamin D deficiency RESOLVED     Anemia during pregnancy in third trimester      premature rupture of membranes (PPROM) with unknown onset of labor         P:  - Continue comfort measures prn.  Maternal positions to encourage fetal rotation/descent and maximize fetal oxygenation.   - Continue prophylactic antibiotic for uinkown GBS status  - MD consultant on call French/ available prn  - Labor induction with Pitocin per protocol  - Reevaluate in 2-4 hours/PRN       Gris SAUCEDA CNM

## 2017-01-24 NOTE — PLAN OF CARE
Data: Evelyn Strauss transferred to [id number] via wheelchair at 0230. Baby transferred via parent's arms.  Action: Receiving unit notified of transfer: Yes. Patient and family notified of room change. Report given to Monica ARCE at 0240. Belongings sent to receiving unit. Accompanied by Registered Nurse. Oriented patient to surroundings. Call light within reach. ID bands double-checked with receiving RN.  Response: Patient tolerated transfer and is stable.

## 2017-01-24 NOTE — PROGRESS NOTES
Labor progress note    S: Pt began second stage d/t feeling rectal pressure with contractions.   Pushed on R lateral after rest for ~13minutes in R lateral with peanut ball to allow for rotation/descent.  Pt now using tug of war pushing technique.   FOB remains at bedside, supportive.     O:  Blood pressure 122/61, pulse 98, temperature 98.2  F (36.8  C), temperature source Oral, resp. rate 16, last menstrual period 05/15/2016, SpO2 96 %.  General appearance: comfortable.  Contractions: Every 1-4 minutes. 50-90 seconds duration.  Palpate: moderate and strong.  Soft resting tone.   FHT: Baseline 160 moderate with periods of minimal variability. Accelerations not present. + variable decelerations present with contraction effort, +late decelerations   ROM: clear fluid. Membranes have been ruptured for 18 hours.  Pelvic exam: 10/ 100%/+2 with pushing effort.  +1 without contraction. Small caput noted.        Pitocin- none,  Antibiotics- PCN  LR at maintenance rate  Epidural in situ  ST for 500ml at 2115    A:  31 year old  with IUP @ 36w1d second stage labor   Fetal Heart rate tracing Category category two  GBS- unknown   Patient Active Problem List   Diagnosis     First pregnancy     Vitamin D deficiency RESOLVED     Anemia during pregnancy in third trimester      premature rupture of membranes (PPROM) with unknown onset of labor         P:  MD consultant on call French/ available prn - updated via text page  Continue second stage, reassess progress in 1 hour and prn fetal and maternal status.     Gris SAUCEDA, JASWANTM

## 2017-01-24 NOTE — PLAN OF CARE
of viable Female with Gris Meyer CNM in attendance. Nursery RN, Danni GEORGE present. Infant with spontaneous cry to mothers abdomen, dried and stimulated. Placenta delivered without complications, pitocin started, second degree laceration, with repairs done. Chanda cares provided. Mother and baby in stable condition.

## 2017-01-24 NOTE — PLAN OF CARE
Problem: Goal Outcome Summary  Goal: Goal Outcome Summary  Outcome: Therapy, progress toward functional goals as expected  Data: Patient transferred to [facility] at 0300 with infant in arms and , Singh, present. Vital signs within normal limits. Postpartum checks within normal limits - fundus at U/U confirmed with L&D RN Melanie SANCHEZ    Nurse assisted pt to bathroom at 0700, patient voided >1000mL. Patient very exhausted and slept as much as possible overnight.  Action: Patient denies pain. Using ice pack and tucks pads for perineal discomfort. Patient education done about bulb syringe usage, infant feeding cues and hand expression. See flow record.  Response: Positive attachment behaviors observed with infant. , Singh, left to go home at 0330.  Plan: Continue plan of care.

## 2017-01-25 VITALS
TEMPERATURE: 97.9 F | SYSTOLIC BLOOD PRESSURE: 104 MMHG | HEART RATE: 78 BPM | DIASTOLIC BLOOD PRESSURE: 76 MMHG | OXYGEN SATURATION: 97 % | RESPIRATION RATE: 14 BRPM

## 2017-01-25 LAB
COPATH REPORT: NORMAL
HGB BLD-MCNC: 10.2 G/DL (ref 11.7–15.7)

## 2017-01-25 PROCEDURE — 85018 HEMOGLOBIN: CPT | Performed by: ADVANCED PRACTICE MIDWIFE

## 2017-01-25 PROCEDURE — 36415 COLL VENOUS BLD VENIPUNCTURE: CPT | Performed by: ADVANCED PRACTICE MIDWIFE

## 2017-01-25 PROCEDURE — 25000132 ZZH RX MED GY IP 250 OP 250 PS 637: Performed by: ADVANCED PRACTICE MIDWIFE

## 2017-01-25 RX ORDER — BREAST PUMP
1 EACH MISCELLANEOUS DAILY PRN
Qty: 1 EACH | Refills: 0 | Status: SHIPPED | OUTPATIENT
Start: 2017-01-25 | End: 2019-02-18

## 2017-01-25 RX ORDER — ASPIRIN 81 MG
100 TABLET, DELAYED RELEASE (ENTERIC COATED) ORAL DAILY
Qty: 100 TABLET | Refills: 1 | Status: SHIPPED | OUTPATIENT
Start: 2017-01-25 | End: 2017-03-06

## 2017-01-25 RX ADMIN — SENNOSIDES AND DOCUSATE SODIUM 2 TABLET: 8.6; 5 TABLET ORAL at 09:20

## 2017-01-25 RX ADMIN — IBUPROFEN 800 MG: 400 TABLET ORAL at 05:00

## 2017-01-25 RX ADMIN — IBUPROFEN 800 MG: 400 TABLET ORAL at 11:32

## 2017-01-25 NOTE — DISCHARGE SUMMARY
[facility] Discharge Summary    Evelyn Strauss MRN# [id number]   Age: 31 year old YOB: 1985     Date of Admission:  2017  Date of Discharge::  2017  Admitting Physician:  SOHAIL Mckeon CNM  Discharge Physician:  Gris Meyer CNM, MS      Home clinic: [facility]          Admission Diagnoses:   maternity*reva  17/water broke labor   premature rupture of membranes (PPROM) with unknown onset of labor            Discharge Diagnosis:   Normal spontaneous vaginal delivery  Pre-term labor            Procedures:   Procedure(s): Repair of second degree perineal laceration       No other significant procedures performed during this admission           Medications Prior to Admission:     Prescriptions prior to admission   Medication Sig Dispense Refill Last Dose     VITAMIN D, CHOLECALCIFEROL, PO Take by mouth daily   2017 at Unknown time     Docosahexaenoic Acid (PRENATAL DHA PO) Take 1 tablet by mouth daily   2017 at Unknown time             Discharge Medications:     Current Discharge Medication List      START taking these medications    Details   ferrous fumarate 65 mg, [place]. FE,-Vitamin C 125 mg (VITRON C)  MG TABS tablet Take 1 tablet by mouth daily  Qty: 100 tablet, Refills: 1    Associated Diagnoses: Postpartum anemia      docusate sodium (COLACE) 100 MG tablet Take 100 mg by mouth daily  Qty: 100 tablet, Refills: 1    Associated Diagnoses: Vaginal delivery         CONTINUE these medications which have NOT CHANGED    Details   VITAMIN D, CHOLECALCIFEROL, PO Take by mouth daily      Docosahexaenoic Acid (PRENATAL DHA PO) Take 1 tablet by mouth daily    Associated Diagnoses: First trimester pregnancy                   Consultations:   Consultation during this admission received from OB MD, lactation          Brief History of Labor:   Evelyn Strauss is a 31 year old   at  36w1d presented to labor floor with c/o PPROM  for clear at 0615.   Agreed to and received induction of labor with PO Misoprostol 25mcg PO x2 doses after admission SVE /-2.  Agreed to and received PCN for unknown GBS.  Requested and received epidural anesthesia at 1830.   Category 2 FHR tracing during day with moderate variability throughout.  Had prolonged deceleration and SVE found Alip/C/+1.   Straight cath by RN for 500ml and pt found to be C/C/+2 at .    R lateral rest with peanut ball after attempt at pushing.  Resumed pushing at  alternating between R lateral and tug of war positioning.    Pushed effectively with CNM coaching. FHR with variable and late decels with pushing effort, periods of tachycardia noted near end of second stage, periods of minimal variability.  No maternal tachycardia, no maternal fever. NICU team requested for delivery.  Head delivered OA and restituted to RYLEE . No nuchal cord. Shoulders easily delivered under maternal effort.  Live female  delivered at 2355 over a perineal laceration under epidural anesthesia.  Spontaneous breath, vigorous cry, well flexed, HR>100. Infant directly to maternal abdomen, skin to skin. Delayed cord clamping for 9 minutes then clamped x2 and cut by FOB.   20 units of pitocin infusing after baby.  Cord blood obtained for typing.  Cord segment for gases.  Intact placenta spontaneously delivered via Miller at 0014.   3 vessel cord. Fundus firm @ U after massage.   Rectum inspected, found to be intact.  2nd degree perineal  laceration repaired with 3-0 vicryl suture on a CT  in the usual fashion.  Bilateral labial lacerations noted oozing despite direct pressure.  Infiltrated with 1% lidocaine and then repaired in usual fashion. Hemostasis noted.    Pt tolerated well.  ST for 600 ml.  Mother and infant stable, continued skin to skin.    Apgars 9/9.  Weight Pending .     Placenta to pathology.    Cord gases       Complications of pregnancy, labor and delivery: Prematurity (<37  "wks) and ROM > 18 hours  Birth attendants  Gris SAUCEDA, JASWANTM             Assessment Day of Discharge    Vital signs:  Temp: 98.2  F (36.8  C) Temp src: Oral BP: 94/71 mmHg Pulse: 79 Heart Rate: 77 Resp: 16            Estimated body mass index is 29.99 kg/(m^2) as calculated from the following:    Height as of 17: 1.575 m (5' 2.01\").    Weight as of 17: 74.39 kg (164 lb).      Breasts: Soft, filling  Nipples: Intact, Non-tender  Abdomen: Soft, Non-tender    Diastatis Recti:  1 FB  Uterus: Fundus Firm, Non-tender, located 1cm below the umbilicus   Lochia: Rubra, appropriate amount    Perineum:  Well-approximated, healing well  Lower Extremities: Trace Edema Bilateral to mid calf.  No clonus, Negative Patricia's Sign           Hospital Course:   The patient's hospital course was unremarkable.  On discharge, her pain was well controlled. Vaginal bleeding is similar to peak menstrual flow.  Voiding without difficulty.  Ambulating well and tolerating a normal diet.  No fever.  Breastfeeding is established - breast pumping and working with lactation d/t infant sleepy at times.  Infant is stable.  + bowel movement without issue. Pt declined RX for Ibuprofen/Tylenol. She was discharged on post-partum day #2.    Post-partum hemoglobin:   HEMOGLOBIN   Date Value Ref Range Status   2017 10.2* 11.7 - 15.7 g/dL Final        Rh:positive  Rubella status: Immune  Plan for contraception:  Reviewed Chapter One of  [facility]  Family Book including warning signs of postpartum, activity level, avoiding IC for 6 weeks, Tub soaks BID, Kegels, abdominal exercises, breast care,  postpartum depression/anxiety. Pt verbalized understanding with teach back.          Discharge Instructions and Follow-Up:   Discharge diet: Regular, Iron rich, High Fiber   Discharge activity: Pelvic rest: abstain from intercourse and do not use tampons for 6 week(s)   Discharge follow-up: Follow up with CNM in 6 weeks for postpartum visit and " prn   Wound care: Drink plenty of fluids  Keep wound clean and dry  Sitz bath 4x/day  High fiber diet, stool softener           Discharge Disposition:   Discharged to home - boarding  prn infant status        Gris Meyer, SOHAIL MICHAUDM

## 2017-01-25 NOTE — PLAN OF CARE
Problem: Goal Outcome Summary  Goal: Goal Outcome Summary  Outcome: Therapy, progress toward functional goals as expected  VSS, postpartum assessment WNL. Pain controlled with Tylenol. Pt ambulating in room and soaked in tub last evening. Voiding without difficulty, passing gas, no BM. Breastfeeding infant Q3H with assist latching and positioning. Pt using nipple shield due to infant being LPT. Pt also hand expressing with assist and pumping after each feed. Continue to promote independence with infant cares and continue plan of care.

## 2017-01-25 NOTE — PLAN OF CARE
Problem: Goal Outcome Summary  Goal: Goal Outcome Summary  Outcome: Adequate for Discharge Date Met:  01/25/17  Data: Vital signs within normal limits. Postpartum checks within normal limits - see flow record. Patient eating and drinking normally. Patient able to empty bladder independently and is up ambulating. No apparent signs of infection. Laceration healing well. Patient performing self cares and is able to care for infant.  Action: Patient medicated during the shift for pain. See MAR. Patient reassessed within 1 hour after each medication and pain was improved - patient stated she was comfortable. Patient education done about discharge instructions. See flow record.  Response: Positive attachment behaviors observed with infant. Support persons Singh present.   Plan:Discharge this afternoon

## 2017-01-25 NOTE — PLAN OF CARE
Problem: Goal Outcome Summary  Goal: Goal Outcome Summary  Outcome: Improving  8282-1813:  VSS and postpartum assessment WDL.  Up ad alexa and independent with cares.  Pain managed with ibuprofen per MAR as well as ice packs and tucks for perineum and hot packs for back.  Also soaked in the bathtub this shift.  Bonding well with infant.  Breastfeeding every 2-3 hours with staff assist due to LPT infant disinterested.  Resource RN Paola BERNAL Assisted pt with feedings and developed the following plan: put baby to breast with nipple shield every 2-3 hours, first putting colostrum on shield. Breast massage, hand expression, and pumping after each feeding.  Use colostrum obtained with these procedures for the next feeding.  , Singh, present and helpful with cares.  [place] completed with a score of 0.  Will continue to provide assistance with feedings while encouraging independence.  Will also continue with postpartum cares and education per plan of care.

## 2017-01-25 NOTE — ADDENDUM NOTE
Addendum  created 01/25/17 0718 by Comfort Paz MD    Modules edited: Anesthesia Events, Anesthesia Responsible Staff, Narrator    Narrator:  Narrator: Event Log Edited

## 2017-03-06 ENCOUNTER — OFFICE VISIT (OUTPATIENT)
Dept: OBGYN | Facility: CLINIC | Age: 32
End: 2017-03-06
Attending: ADVANCED PRACTICE MIDWIFE
Payer: COMMERCIAL

## 2017-03-06 VITALS
HEIGHT: 62 IN | DIASTOLIC BLOOD PRESSURE: 69 MMHG | BODY MASS INDEX: 27.99 KG/M2 | WEIGHT: 152.1 LBS | SYSTOLIC BLOOD PRESSURE: 112 MMHG | HEART RATE: 80 BPM

## 2017-03-06 PROCEDURE — 99212 OFFICE O/P EST SF 10 MIN: CPT | Mod: ZF

## 2017-03-06 NOTE — MR AVS SNAPSHOT
After Visit Summary   3/6/2017    Evelyn Strauss    MRN: [id number]           Patient Information     Date Of Birth          1985        Visit Information        Provider Department      3/6/2017 11:00 AM Ruby Novak APRN CNM [facility]        Today's Diagnoses     Routine postpartum follow-up    -  1    Postpartum care and examination of lactating mother           Follow-ups after your visit        Your next 10 appointments already scheduled     Mar 20, 2017  1:00 PM CDT   Return Visit with SOHAIL Purdy CNM   [facility] ([facility])    [facility] [address]  [address]  [address]  [facility] [id number] [phone]              Who to contact     Please call your clinic at [phone] to:    Ask questions about your health    Make or cancel appointments    Discuss your medicines    Learn about your test results    Speak to your doctor   If you have compliments or concerns about an experience at your clinic, or if you wish to file a complaint, please contact [facility] Patient Relations at [phone] or email us at [email or web address]         Additional Information About Your Visit        MyChart Information     [employer] gives you secure access to your electronic health record. If you see a primary care provider, you can also send messages to your care team and make appointments. If you have questions, please call your primary care clinic.  If you do not have a primary care provider, please call [phone] and they will assist you.      [employer] is an electronic gateway that provides easy, online access to your medical records. With [employer], you can request a clinic appointment, read your test results, renew a prescription or communicate with your care team.     To access your existing account, please contact your [facility]  "Clinic or call [phone] for assistance.        Care EveryWhere ID     This is your Care EveryWhere ID. This could be used by other organizations to access your [place] medical records  [id number]        Your Vitals Were     Pulse Height Last Period BMI (Body Mass Index)          80 1.575 m (5' 2.01\") 05/15/2016 27.81 kg/m2         Blood Pressure from Last 3 Encounters:   03/06/17 112/69   01/25/17 104/76   01/11/17 97/61    Weight from Last 3 Encounters:   03/06/17 69 kg (152 lb 1.6 oz)   01/11/17 74.4 kg (164 lb)   12/29/16 73 kg (161 lb)              Today, you had the following     No orders found for display       Primary Care Provider Office Phone # Fax #    [facility] [phone]       [address] [facility] [address]        Thank you!     Thank you for choosing [facility]  for your care. Our goal is always to provide you with excellent care. Hearing back from our patients is one way we can continue to improve our services. Please take a few minutes to complete the written survey that you may receive in the mail after your visit with us. Thank you!             Your Updated Medication List - Protect others around you: Learn how to safely use, store and throw away your medicines at www.disposemymeds.org.          This list is accurate as of: 3/6/17 11:59 PM.  Always use your most recent med list.                   Brand Name Dispense Instructions for use    breast pump Misc     1 each    1 each daily as needed       PRENATAL DHA PO      Take 1 tablet by mouth daily       VITAMIN D (CHOLECALCIFEROL) PO      Take by mouth daily         "

## 2017-03-06 NOTE — NURSING NOTE
Chief Complaint   Patient presents with     Postpartum Care       SUBJECTIVE:   Evelyn Strauss is here for her 6-week postpartum checkup.     PHQ-9 score: 1  Hx of Abuse:  No    Delivery Date: 17.    Delivering provider:  Gris Meyer CNM.    Type of delivery:  .  Perineum:  tear, with repair.     Delivery complications: None  Infant gender:  girl, weight 6 pounds 9 oz.  Feeding Method:  Bottlefed.  Complications reported with feeding:  none, infant thriving .    Bleeding:  Heavy-Mod.  Duration:  4 weeks.  Menses resumed:  Yes   Bowel/Urinary problems:  No    Contraception Planned:  condoms  She  has not had intercourse since delivery..

## 2017-03-06 NOTE — LETTER
"3/6/2017       RE: Evelyn Strauss  [address] [facility] [address]     Dear Colleague,    Thank you for referring your patient, Evelyn Strauss, to the [facility] at [facility]. Please see a copy of my visit note below.    Nursing Notes:   GaloDagoberto Veronica, LIZZ  3/6/2017 11:26 AM  Signed  Chief Complaint   Patient presents with     Postpartum Care       SUBJECTIVE:   Evelyn Strauss is here for her 6-week postpartum checkup.     PHQ-9 score: 1 (for tired, little energy)  Hx of Abuse:  No    Delivery Date: 17.    Delivering provider:  Gris Meyer CNM.    Type of delivery:  .  Perineum:  tear, with repair.     Delivery complications: None  Infant gender:  girl, weight 6 pounds 9 oz.  Feeding Method:  Bottlefed.  Complications reported with feeding:  none, infant thriving .    Bleeding:  Heavy-Mod.  Duration:  4 weeks.  Menses resumed:  Yes   Bowel/Urinary problems:  No    Contraception Planned:  condoms  She  has not had intercourse since delivery..       Evelyn says she is doing well, only concern is some occasional discomfort she believes is due to stitches ( confirms some suture still present).  No urinary or bowel symptoms, no constipation.  No vaginal bleeding since 2 weeks postpartum.  States her mood is \"pretty good\" - denies low mood, loss of interest, or thoughts of harming self or others. Endorses feeling tired, and having difficulty sleeping.   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    Is not breastfeeding but is pumping very regularly, about 7-8 times daily. States baby has had trouble breastfeeding. Saw lactation last Friday - baby is able to latch and appears to be feeding, but when measured afterwards has not gained weight. Takes breast milk by bottle well and is growing well, with no use of formula.     Has not had intercourse yet but is " "planning condoms for contraception. Has resumed hot yoga for exercise.    Last pap 2016, NIL.      ================================================================  ROS: 10 point ROS neg other than the symptoms noted above in the HPI.     EXAM:  /69 (BP Location: Left arm, Patient Position: Chair, Cuff Size: Adult Regular)  Pulse 80  Ht 1.575 m (5' 2.01\")  Wt 69 kg (152 lb 1.6 oz)  LMP 05/15/2016  BMI 27.81 kg/m2    General: healthy, alert and no distress  Psych: negative for depression, loss of interest, thoughts of self-harm and thoughts of hurting someone else; positive for sleep disturbance.  Breasts:  Lactating, Nipples intact with no lesions, Non-tender and No S/S of yeast or mastitis  Abdomen: Benign, Soft, flat, non-tender, No masses, organomegaly and Diastasis 1.5 FB  Incision:  None   Vulva:  Normal genitalia and Bartholin's, Urethra, [place]'s normal  Vagina:  normal with good muscle tone, loop of surgical suture visible, small area of red/pink apparent granulation tissue at posterior introitus.   Cervix: deferred  Uterus:  deferred  Adnexa:  deferred  Recto-vaginal: anus normal      ASSESSMENT:   Encounter Diagnoses   Name Primary?     Routine postpartum follow-up Yes     Postpartum care and examination of lactating mother       Normal postpartum exam after .  Pregnancy was complicated by: PPROM at 36w1d with prolonged ROM, second degree perineal laceration.      PLAN:  No orders of the defined types were placed in this encounter.     No orders of the defined types were placed in this encounter.     Routine postpartum follow-up  - Silver nitrate applied today, lidocaine cream given - advised to apply as needed, recommended daily soaks.  - Follow-up in 2 wks.  - Discussed breast pumping expectations including possibility of lower supply, encouraged planning to ensure dedicated time to pump when she returns to work.  - Encouraged deferring intercourse as needed to prevent discomfort surrounding " granulation tissue, advised lubrication may be necessary while she is producing breast milk.   - Encouraged continued hot yoga for exercise, though recommended gentle core strengthening activities over overt core work (e.g. crunches).     Risks and benefits of prescribed medications discussed.  Medication instructions reviewed.  Discussed calcium intake, vitamins and supplements including Vitamin D  Exercise encouraged  Follow up in 1 year       I, Rosette Swenson, served as scribe for SOHAIL Purdy CNM, during this encounter.     Patient was seen with student who acted as my scribe. I personally assessed, examined and made medical decisions reflected in the documentation. Any necessary edits to the note have been made by myself. SOHAIL Dove CNM          Again, thank you for allowing me to participate in the care of your patient.      Sincerely,    SOHAIL Dove CNM

## 2017-03-06 NOTE — PROGRESS NOTES
"Nursing Notes:   Veronica Fisher, [facility]  3/6/2017 11:26 AM  Signed  Chief Complaint   Patient presents with     Postpartum Care       SUBJECTIVE:   Evelyn Strauss is here for her 6-week postpartum checkup.     PHQ-9 score: 1 (for tired, little energy)  Hx of Abuse:  No    Delivery Date: 17.    Delivering provider:  Gris Meyer CNM.    Type of delivery:  .  Perineum:  tear, with repair.     Delivery complications: None  Infant gender:  girl, weight 6 pounds 9 oz.  Feeding Method:  Bottlefed.  Complications reported with feeding:  none, infant thriving .    Bleeding:  Heavy-Mod.  Duration:  4 weeks.  Menses resumed:  Yes   Bowel/Urinary problems:  No    Contraception Planned:  condoms  She  has not had intercourse since delivery..       Evelyn says she is doing well, only concern is some occasional discomfort she believes is due to stitches ( confirms some suture still present).  No urinary or bowel symptoms, no constipation.  No vaginal bleeding since 2 weeks postpartum.  States her mood is \"pretty good\" - denies low mood, loss of interest, or thoughts of harming self or others. Endorses feeling tired, and having difficulty sleeping.   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    Is not breastfeeding but is pumping very regularly, about 7-8 times daily. States baby has had trouble breastfeeding. Saw lactation last Friday - baby is able to latch and appears to be feeding, but when measured afterwards has not gained weight. Takes breast milk by bottle well and is growing well, with no use of formula.     Has not had intercourse yet but is planning condoms for contraception. Has resumed hot yoga for exercise.    Last pap 2016, NIL.      ================================================================  ROS: 10 point ROS neg other than the symptoms noted above in the HPI.     EXAM:  /69 (BP Location: Left arm, Patient " "Position: Chair, Cuff Size: Adult Regular)  Pulse 80  Ht 1.575 m (5' 2.01\")  Wt 69 kg (152 lb 1.6 oz)  LMP 05/15/2016  BMI 27.81 kg/m2    General: healthy, alert and no distress  Psych: negative for depression, loss of interest, thoughts of self-harm and thoughts of hurting someone else; positive for sleep disturbance.  Breasts:  Lactating, Nipples intact with no lesions, Non-tender and No S/S of yeast or mastitis  Abdomen: Benign, Soft, flat, non-tender, No masses, organomegaly and Diastasis 1.5 FB  Incision:  None   Vulva:  Normal genitalia and Bartholin's, Urethra, [place]'s normal  Vagina:  normal with good muscle tone, loop of surgical suture visible, small area of red/pink apparent granulation tissue at posterior introitus.   Cervix: deferred  Uterus:  deferred  Adnexa:  deferred  Recto-vaginal: anus normal      ASSESSMENT:   Encounter Diagnoses   Name Primary?     Routine postpartum follow-up Yes     Postpartum care and examination of lactating mother       Normal postpartum exam after .  Pregnancy was complicated by: PPROM at 36w1d with prolonged ROM, second degree perineal laceration.      PLAN:  No orders of the defined types were placed in this encounter.     No orders of the defined types were placed in this encounter.     Routine postpartum follow-up  - Silver nitrate applied today, lidocaine cream given - advised to apply as needed, recommended daily soaks.  - Follow-up in 2 wks.  - Discussed breast pumping expectations including possibility of lower supply, encouraged planning to ensure dedicated time to pump when she returns to work.  - Encouraged deferring intercourse as needed to prevent discomfort surrounding granulation tissue, advised lubrication may be necessary while she is producing breast milk.   - Encouraged continued hot yoga for exercise, though recommended gentle core strengthening activities over overt core work (e.g. crunches).     Risks and benefits of prescribed medications " discussed.  Medication instructions reviewed.  Discussed calcium intake, vitamins and supplements including Vitamin D  Exercise encouraged  Follow up in 1 year       I, Rosette Swenson, served as scribe for SOHAIL Purdy CNM, during this encounter.     Patient was seen with student who acted as my scribe. I personally assessed, examined and made medical decisions reflected in the documentation. Any necessary edits to the note have been made by myself. SOHAIL Dove CNM

## 2017-03-07 ASSESSMENT — PATIENT HEALTH QUESTIONNAIRE - PHQ9: SUM OF ALL RESPONSES TO PHQ QUESTIONS 1-9: 1

## 2017-03-20 ENCOUNTER — OFFICE VISIT (OUTPATIENT)
Dept: OBGYN | Facility: CLINIC | Age: 32
End: 2017-03-20
Attending: PHYSICAL MEDICINE & REHABILITATION
Payer: COMMERCIAL

## 2017-03-20 VITALS
HEIGHT: 62 IN | BODY MASS INDEX: 27.44 KG/M2 | DIASTOLIC BLOOD PRESSURE: 78 MMHG | HEART RATE: 76 BPM | SYSTOLIC BLOOD PRESSURE: 116 MMHG | WEIGHT: 149.1 LBS

## 2017-03-20 DIAGNOSIS — L92.9 GRANULATION TISSUE FOLLOWING LACERATION: Primary | ICD-10-CM

## 2017-03-20 PROCEDURE — 99211 OFF/OP EST MAY X REQ PHY/QHP: CPT | Mod: ZF

## 2017-03-20 ASSESSMENT — ANXIETY QUESTIONNAIRES
GAD7 TOTAL SCORE: 1
2. NOT BEING ABLE TO STOP OR CONTROL WORRYING: NOT AT ALL
5. BEING SO RESTLESS THAT IT IS HARD TO SIT STILL: NOT AT ALL
3. WORRYING TOO MUCH ABOUT DIFFERENT THINGS: NOT AT ALL
1. FEELING NERVOUS, ANXIOUS, OR ON EDGE: NOT AT ALL
7. FEELING AFRAID AS IF SOMETHING AWFUL MIGHT HAPPEN: NOT AT ALL
6. BECOMING EASILY ANNOYED OR IRRITABLE: SEVERAL DAYS

## 2017-03-20 ASSESSMENT — PATIENT HEALTH QUESTIONNAIRE - PHQ9: 5. POOR APPETITE OR OVEREATING: NOT AT ALL

## 2017-03-20 ASSESSMENT — PAIN SCALES - GENERAL: PAINLEVEL: NO PAIN (0)

## 2017-03-20 NOTE — MR AVS SNAPSHOT
After Visit Summary   3/20/2017    Evelyn Strauss    MRN: [id number]           Patient Information     Date Of Birth          1985        Visit Information        Provider Department      3/20/2017 1:00 PM Ruby Novak APRN CNM [facility]        Today's Diagnoses     Granulation tissue following laceration    -  1       Follow-ups after your visit        Follow-up notes from your care team     Return in about 1 year (around 3/20/2018).      Who to contact     Please call your clinic at [phone] to:    Ask questions about your health    Make or cancel appointments    Discuss your medicines    Learn about your test results    Speak to your doctor   If you have compliments or concerns about an experience at your clinic, or if you wish to file a complaint, please contact [facility] Patient Relations at [phone] or email us at [email or web address]         Additional Information About Your Visit        MyChart Information     [employer] gives you secure access to your electronic health record. If you see a primary care provider, you can also send messages to your care team and make appointments. If you have questions, please call your primary care clinic.  If you do not have a primary care provider, please call [phone] and they will assist you.      [employer] is an electronic gateway that provides easy, online access to your medical records. With [employer], you can request a clinic appointment, read your test results, renew a prescription or communicate with your care team.     To access your existing account, please contact your [facility] Clinic or call [phone] for assistance.        Care EveryWhere ID     This is your Care EveryWhere ID. This could be used by other organizations to access your [place] medical records  [id number]        Your Vitals Were     Pulse Height Last Period BMI (Body  "Mass Index)          76 1.575 m (5' 2\") 05/15/2016 27.27 kg/m2         Blood Pressure from Last 3 Encounters:   03/20/17 116/78   03/06/17 112/69   01/25/17 104/76    Weight from Last 3 Encounters:   03/20/17 67.6 kg (149 lb 1.6 oz)   03/06/17 69 kg (152 lb 1.6 oz)   01/11/17 74.4 kg (164 lb)              Today, you had the following     No orders found for display       Primary Care Provider Office Phone # Fax #    [facility] [phone] [address] [facility] [address]        Thank you!     Thank you for choosing [facility]  for your care. Our goal is always to provide you with excellent care. Hearing back from our patients is one way we can continue to improve our services. Please take a few minutes to complete the written survey that you may receive in the mail after your visit with us. Thank you!             Your Updated Medication List - Protect others around you: Learn how to safely use, store and throw away your medicines at www.disposemymeds.org.          This list is accurate as of: 3/20/17 11:59 PM.  Always use your most recent med list.                   Brand Name Dispense Instructions for use    breast pump Misc     1 each    1 each daily as needed       PRENATAL DHA PO      Take 1 tablet by mouth daily       VITAMIN D (CHOLECALCIFEROL) PO      Take by mouth daily Reported on 3/20/2017         "

## 2017-03-21 ASSESSMENT — PATIENT HEALTH QUESTIONNAIRE - PHQ9: SUM OF ALL RESPONSES TO PHQ QUESTIONS 1-9: 1

## 2017-03-21 ASSESSMENT — ANXIETY QUESTIONNAIRES: GAD7 TOTAL SCORE: 1

## 2017-03-25 NOTE — PROGRESS NOTES
"Pt here for recheck of perineal healing after vaginal birth. Pt is 8 weeks postpartum. Has been soaking in tub, has not looked at perineum. Has not resumed SI. No sig pain at perineal tear site. No bleeding. Adjusting well to parenthood, breastfeeding going well, baby thriving.   ROS: 10 point ROS neg other than the symptoms noted above in the HPI.  O:  /78 (BP Location: Right arm, Patient Position: Chair, Cuff Size: Adult Regular)  Pulse 76  Ht 1.575 m (5' 2\")  Wt 67.6 kg (149 lb 1.6 oz)  LMP 05/15/2016  BMI 27.27 kg/m2  Pelvic Exam:  Vulva: No external lesions other than very small area at posterior opening of introitus of deep pink granulation tissue (pen point size), normal hair distribution, no adenopathy  Vagina: Moist, pink, no abnormal discharge, well rugated, no lesions  Rectum normal  A: 8 wks postpartum      Healing perineum w small granulation tissue  P: one touch w silver nitrate applied. Pt has lidocaine gel prn. Discussed healing. Pt welcome to RTC for another check or look herself with a mirror. Expect this treatment to be enough for complete healing. Call prn.  Ruby Novak, APRN CNM    "
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
## 2019-02-18 ENCOUNTER — ANCILLARY PROCEDURE (OUTPATIENT)
Dept: ULTRASOUND IMAGING | Facility: CLINIC | Age: 34
End: 2019-02-18
Attending: ADVANCED PRACTICE MIDWIFE
Payer: COMMERCIAL

## 2019-02-18 ENCOUNTER — TELEPHONE (OUTPATIENT)
Dept: OBGYN | Facility: CLINIC | Age: 34
End: 2019-02-18

## 2019-02-18 ENCOUNTER — TRANSCRIBE ORDERS (OUTPATIENT)
Dept: OBGYN | Facility: CLINIC | Age: 34
End: 2019-02-18

## 2019-02-18 ENCOUNTER — OFFICE VISIT (OUTPATIENT)
Dept: OBGYN | Facility: CLINIC | Age: 34
End: 2019-02-18
Attending: ADVANCED PRACTICE MIDWIFE
Payer: COMMERCIAL

## 2019-02-18 VITALS
BODY MASS INDEX: 23 KG/M2 | WEIGHT: 125 LBS | DIASTOLIC BLOOD PRESSURE: 67 MMHG | SYSTOLIC BLOOD PRESSURE: 102 MMHG | HEART RATE: 67 BPM | HEIGHT: 62 IN

## 2019-02-18 DIAGNOSIS — Z23 NEED FOR PROPHYLACTIC VACCINATION AND INOCULATION AGAINST INFLUENZA: ICD-10-CM

## 2019-02-18 DIAGNOSIS — O09.213 SUPERVISION OF PREGNANCY WITH HISTORY OF PRE-TERM LABOR IN THIRD TRIMESTER: ICD-10-CM

## 2019-02-18 DIAGNOSIS — Z34.00 SUPERVISION OF NORMAL FIRST PREGNANCY, ANTEPARTUM: ICD-10-CM

## 2019-02-18 DIAGNOSIS — O26.90 PREGNANCY RELATED CONDITION, ANTEPARTUM: Primary | ICD-10-CM

## 2019-02-18 DIAGNOSIS — Z34.01 ENCOUNTER FOR SUPERVISION OF NORMAL FIRST PREGNANCY IN FIRST TRIMESTER: ICD-10-CM

## 2019-02-18 LAB
ABO + RH BLD: NORMAL
ABO + RH BLD: NORMAL
BLD GP AB SCN SERPL QL: NORMAL
BLOOD BANK CMNT PATIENT-IMP: NORMAL
ERYTHROCYTE [DISTWIDTH] IN BLOOD BY AUTOMATED COUNT: 12.1 % (ref 10–15)
HBV SURFACE AB SERPL IA-ACNC: >1000 M[IU]/ML
HBV SURFACE AG SERPL QL IA: NONREACTIVE
HCT VFR BLD AUTO: 35.2 % (ref 35–47)
HGB BLD-MCNC: 12 G/DL (ref 11.7–15.7)
HIV 1+2 AB+HIV1 P24 AG SERPL QL IA: NONREACTIVE
MCH RBC QN AUTO: 30.4 PG (ref 26.5–33)
MCHC RBC AUTO-ENTMCNC: 34.1 G/DL (ref 31.5–36.5)
MCV RBC AUTO: 89 FL (ref 78–100)
PLATELET # BLD AUTO: 240 10E9/L (ref 150–450)
RBC # BLD AUTO: 3.95 10E12/L (ref 3.8–5.2)
RUBV IGG SERPL IA-ACNC: 34 IU/ML
SPECIMEN EXP DATE BLD: NORMAL
T PALLIDUM AB SER QL: NONREACTIVE
WBC # BLD AUTO: 7.5 10E9/L (ref 4–11)

## 2019-02-18 PROCEDURE — 86850 RBC ANTIBODY SCREEN: CPT | Performed by: ADVANCED PRACTICE MIDWIFE

## 2019-02-18 PROCEDURE — 87086 URINE CULTURE/COLONY COUNT: CPT | Performed by: ADVANCED PRACTICE MIDWIFE

## 2019-02-18 PROCEDURE — 87389 HIV-1 AG W/HIV-1&-2 AB AG IA: CPT | Performed by: ADVANCED PRACTICE MIDWIFE

## 2019-02-18 PROCEDURE — 86901 BLOOD TYPING SEROLOGIC RH(D): CPT | Performed by: ADVANCED PRACTICE MIDWIFE

## 2019-02-18 PROCEDURE — 86762 RUBELLA ANTIBODY: CPT | Performed by: ADVANCED PRACTICE MIDWIFE

## 2019-02-18 PROCEDURE — 36415 COLL VENOUS BLD VENIPUNCTURE: CPT | Performed by: ADVANCED PRACTICE MIDWIFE

## 2019-02-18 PROCEDURE — 87340 HEPATITIS B SURFACE AG IA: CPT | Performed by: ADVANCED PRACTICE MIDWIFE

## 2019-02-18 PROCEDURE — 76801 OB US < 14 WKS SINGLE FETUS: CPT

## 2019-02-18 PROCEDURE — 86900 BLOOD TYPING SEROLOGIC ABO: CPT | Performed by: ADVANCED PRACTICE MIDWIFE

## 2019-02-18 PROCEDURE — 0064U ANTB TP TOTAL&RPR IA QUAL: CPT | Performed by: ADVANCED PRACTICE MIDWIFE

## 2019-02-18 PROCEDURE — G0463 HOSPITAL OUTPT CLINIC VISIT: HCPCS | Mod: 25,ZF

## 2019-02-18 PROCEDURE — 86706 HEP B SURFACE ANTIBODY: CPT | Performed by: ADVANCED PRACTICE MIDWIFE

## 2019-02-18 PROCEDURE — 85027 COMPLETE CBC AUTOMATED: CPT | Performed by: ADVANCED PRACTICE MIDWIFE

## 2019-02-18 SDOH — HEALTH STABILITY: MENTAL HEALTH: HOW OFTEN DO YOU HAVE A DRINK CONTAINING ALCOHOL?: NEVER

## 2019-02-18 ASSESSMENT — PAIN SCALES - GENERAL: PAINLEVEL: NO PAIN (0)

## 2019-02-18 ASSESSMENT — MIFFLIN-ST. JEOR: SCORE: 1215.25

## 2019-02-18 NOTE — PATIENT INSTRUCTIONS
BIRTH AND 17-alpha hydroxyprogesterone caproate (17P) TREATMENT    What is  birth?      birth is the delivery of a baby before 37 weeks of gestation.  Approximately 1 in every 12 babies in [place] is born premature (that s approximately 6,000 babies every year)!     birth is often unexpected, but can happen for many reasons. There are some known risk factors, which include:   -pregnancy with twins or triplets   -being  race  -some problems with the uterus or cervix   -some medical problems like high blood pressure   -smoking, drinking, or using illegal drugs while pregnant   -infections like urinary tract infection, bacterial vaginosis and chlamydia while    pregnant  -depression, stress, and anxiety    But the biggest risk factor is having a previous  baby. In fact, women who have one  birth have a 30-50% chance of their next baby coming early too.     What are the risks to a baby that delivers prematurely?     birth is the number one cause of  death worldwide. This risk increases the earlier the baby is born.  Prematurity can also cause serious long term health problems for babies such as breathing problems, infections, bleeding into the brain, as well as long term developmental problems like cerebral palsy, learning impairments, hearing and vision problems.    How can I prevent  birth in my pregnancy?  Overall, the best thing to prevent  delivery is to stay healthy during your pregnancy, and follow up with your doctor for your regular visits and screening tests.  If you have a history of  birth in one of your past pregnancies, you may benefit from weekly injections of 17P.     What is 17P?  The full name is 17-alpha hydroxyprogesterone caproate. This is a form of your body s natural  pregnancy hormone , progesterone. The brand name of this is Yessy, and it is FDA approved for prevention of  birth.  This medication  is given in once weekly injections from week 16-20 through the 36th week of pregnancy. Research shows that women taking 17P can reduce their risk of  birth from 50% to 36%. The treatment has also been shown to reduce the risk of complications after birth, such as bleeding in the brain and need for supplemental oxygen.    It is important to complete the treatment once you start, as the risk of  birth goes up if you stop the injections early.    How can I get started on the Yessy treatment?  First, you should talk with your doctor to find out if this is a good option for you.  It is safe for pregnant women and for the fetus, but if you have some medical problems like uncontrolled blood pressure, breast cancer, or liver disease you should talk about it with your doctor first.  Your clinic will work with you to help determine insurance coverage and preauthorization if needed.  Then you will schedule weekly visits for 17P injections in addition to your routine prenatal visits with your doctor.    Side Effects of Mekena  The most common side effects  reported by [place] users are   injection site reactions (pain [35%], swelling  [17%], pruritus (itching) [6%], nodule [5%]), urticaria (rash) (12%), pruritus (generalized itching (8%), nausea (6%), and diarrhea (2%).
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
## 2019-02-18 NOTE — TELEPHONE ENCOUNTER
Invited Evelyn to join group class beginning 6/5/19 for due dates in October. Gave mindfulness group prenatal care handout and gave phone number to call to be added to the class list.

## 2019-02-19 LAB
BACTERIA SPEC CULT: NO GROWTH
Lab: NORMAL
SPECIMEN SOURCE: NORMAL

## 2019-02-19 ASSESSMENT — ANXIETY QUESTIONNAIRES
GAD7 TOTAL SCORE: 0
2. NOT BEING ABLE TO STOP OR CONTROL WORRYING: NOT AT ALL
GAD7 TOTAL SCORE: 0
5. BEING SO RESTLESS THAT IT IS HARD TO SIT STILL: NOT AT ALL
1. FEELING NERVOUS, ANXIOUS, OR ON EDGE: NOT AT ALL
7. FEELING AFRAID AS IF SOMETHING AWFUL MIGHT HAPPEN: NOT AT ALL
6. BECOMING EASILY ANNOYED OR IRRITABLE: NOT AT ALL
3. WORRYING TOO MUCH ABOUT DIFFERENT THINGS: NOT AT ALL
7. FEELING AFRAID AS IF SOMETHING AWFUL MIGHT HAPPEN: NOT AT ALL
4. TROUBLE RELAXING: NOT AT ALL

## 2019-02-20 ASSESSMENT — ANXIETY QUESTIONNAIRES: GAD7 TOTAL SCORE: 0

## 2019-03-01 ENCOUNTER — PRE VISIT (OUTPATIENT)
Dept: MATERNAL FETAL MEDICINE | Facility: CLINIC | Age: 34
End: 2019-03-01

## 2019-03-04 ENCOUNTER — OFFICE VISIT (OUTPATIENT)
Dept: OBGYN | Facility: CLINIC | Age: 34
End: 2019-03-04
Attending: ADVANCED PRACTICE MIDWIFE
Payer: COMMERCIAL

## 2019-03-04 ENCOUNTER — OFFICE VISIT (OUTPATIENT)
Dept: MATERNAL FETAL MEDICINE | Facility: CLINIC | Age: 34
End: 2019-03-04
Attending: ADVANCED PRACTICE MIDWIFE
Payer: COMMERCIAL

## 2019-03-04 VITALS
SYSTOLIC BLOOD PRESSURE: 99 MMHG | HEIGHT: 62 IN | BODY MASS INDEX: 23.72 KG/M2 | HEART RATE: 78 BPM | WEIGHT: 128.9 LBS | DIASTOLIC BLOOD PRESSURE: 65 MMHG

## 2019-03-04 DIAGNOSIS — O09.92 HRP (HIGH RISK PREGNANCY), SECOND TRIMESTER: Primary | ICD-10-CM

## 2019-03-04 DIAGNOSIS — O09.213 SUPERVISION OF PREGNANCY WITH HISTORY OF PRE-TERM LABOR IN THIRD TRIMESTER: ICD-10-CM

## 2019-03-04 DIAGNOSIS — O09.521 SUPERVISION OF ELDERLY MULTIGRAVIDA IN FIRST TRIMESTER: Primary | ICD-10-CM

## 2019-03-04 DIAGNOSIS — O26.90 PREGNANCY RELATED CONDITION, ANTEPARTUM: ICD-10-CM

## 2019-03-04 PROBLEM — O09.90 HRP (HIGH RISK PREGNANCY), UNSPECIFIED TRIMESTER: Chronic | Status: ACTIVE | Noted: 2019-02-18

## 2019-03-04 PROBLEM — O09.90 HRP (HIGH RISK PREGNANCY), UNSPECIFIED TRIMESTER: Status: ACTIVE | Noted: 2019-02-18

## 2019-03-04 PROCEDURE — 36415 COLL VENOUS BLD VENIPUNCTURE: CPT | Performed by: OBSTETRICS & GYNECOLOGY

## 2019-03-04 PROCEDURE — 87591 N.GONORRHOEAE DNA AMP PROB: CPT | Performed by: ADVANCED PRACTICE MIDWIFE

## 2019-03-04 PROCEDURE — 96040 ZZH GENETIC COUNSELING, EACH 30 MINUTES: CPT | Mod: ZF | Performed by: GENETIC COUNSELOR, MS

## 2019-03-04 PROCEDURE — G0463 HOSPITAL OUTPT CLINIC VISIT: HCPCS | Mod: ZF

## 2019-03-04 PROCEDURE — 40000791 ZZHCL STATISTIC VERIFI PRENATAL TRISOMY 21,18,13: Performed by: OBSTETRICS & GYNECOLOGY

## 2019-03-04 PROCEDURE — 87491 CHLMYD TRACH DNA AMP PROBE: CPT | Performed by: ADVANCED PRACTICE MIDWIFE

## 2019-03-04 RX ORDER — HYDROXYPROGESTERONE CAPROATE 250 MG/ML
250 INJECTION INTRAMUSCULAR
Qty: 1 ML | Refills: 0 | Status: SHIPPED | OUTPATIENT
Start: 2019-03-04 | End: 2019-03-04

## 2019-03-04 RX ORDER — HYDROXYPROGESTERONE CAPROATE 250 MG/ML
250 INJECTION INTRAMUSCULAR
Status: ACTIVE | OUTPATIENT
Start: 2019-03-04 | End: 2019-08-26

## 2019-03-04 ASSESSMENT — MIFFLIN-ST. JEOR: SCORE: 1232.94

## 2019-03-04 ASSESSMENT — PATIENT HEALTH QUESTIONNAIRE - PHQ9: SUM OF ALL RESPONSES TO PHQ QUESTIONS 1-9: 3

## 2019-03-04 ASSESSMENT — PAIN SCALES - GENERAL: PAINLEVEL: NO PAIN (0)

## 2019-03-04 NOTE — PROGRESS NOTES
"  Evaluation for 17P   Is this current pregnancy a peters pregnancy? Yes  Has this patient experienced a spontaneous,  birth or  rupture of membranes between 20 - 37 weeks of a peters pregnancy? Yes    Both answers are \"Yes\" the patient may be a candidate for \"17P\" Yessy.        Assessment:  Evelyn is a 33 year old  at 10w5d with a history of prior spontaneous peters  birth.  Given this history, Evelyn is at risk for recurrent  birth in this pregnancy.  I discussed the availability of 17-alpha hydroxyprogesterone caproate (17P), which has been demonstrated to reduce the incidence of recurrent  birth and Evelyn does meet criteria for weekly 17P administration in this pregnancy.    Patient has no contraindications to 17P.    Informed patient that 17P requires a commitment to weekly injection which should begin before 20+6 weeks and abrupt discontinuation can increase the risk for  birth.    Plan:   17P 250mg IM weekly beginning between 16-20 weeks through 36 weeks gestation  Serial transvaginal ultrasound for cervical length from 16 through 22-23 weeks gestation  Screen for asymptomatic bacteriuria at first prenatal visit and treat with appropriate antibiotics if present  Smoking risk discussed. Non smoking household.          "

## 2019-03-04 NOTE — PROGRESS NOTES
[facility]  Genetic Counseling Consult    Patient: Evelyn Strauss YOB: 1985   Date of Service: 3/04/19      Evelyn Strauss was seen at [facility] for genetic consultation to discuss the options for screening and testing for fetal chromosome abnormalities.  The indication for genetic counseling is advanced maternal age.        Impression/Plan:   1.  Evelyn had a blood draw for NIPT (Innatal test through [facility] lab).  Results are expected within 7-10 days, and will be available in 3Touch.  We will contact her to discuss the results, and a copy will be forwarded to the office of the referring OB provider. Evelyn requested a detailed message with results on her voicemail ([phone]). She does not want fetal sex information on her voicemail.     2.  Maternal serum AFP (single marker screen) is recommended after 15 weeks to screen for open neural tube defects. A quad screen should not be performed.    3.  An 18-20 week comprehensive ultrasound is standard of care for all women 35 or older at delivery.    Pregnancy History:   /Parity:    Age at Delivery: 34 year old  MERY: 2019, by Last Menstrual Period  Gestational Age: 10w5d    No significant complications or exposures were reported in the current pregnancy.    Pregnancy History:  o 2017: term, , female       Family History:   A three-generation pedigree was obtained in a previous pregnancy and was updated today. The following was previously reported:    Evelyn reports that she has a paternal uncle who has a set of twins (a boy and girl) who both have mental retardation.  She was not aware of a particular genetic cause being identified for this issue, nor was she aware of any other family history of cognitive disability in numerous other relatives on that side of the family.  We talked about that there are many potential causes for cognitive disabilities,  "including both genetic and environmental and multifactorial causes.  We talked about that it is hard to be specific about risks to other family members, since the cause of these cousin's cognitive disability is not known.       Evelyn reports that her  has a sister that was born with cleft lip.  Evelyn was not aware of any other physical birth defects, significant medical issues, or developmental concerns in this individual; she was not aware of any other family history of cleft lip.       We talked about that most cleft lip (+/- palate) occurs as an isolated birth defect, meaning that most commonly, this finding is not part of an underlying genetic syndrome or condition.  We talked about that most isolated cleft lip (+/- palate) is thought to have a \"multifactorial inheritance\" pattern, meaning that there are likely several genetic and/or environmental factors that have to come together in order to produce that birth defect.  We talked about that for most types of birth defects, we don't know those exact risk factors.  We talked about that, in the majority of circumstances, we would expect that the risk of a cleft lip in this pregnancy would still be low.       Evelyn states that her  had a brother that  of complications of epilepsy.   She reports that she is not aware of a specific cause being identified for these seizures, nor was she aware of any specific genetic condition being diagnosed in this individual.  We talked about that seizures can sometimes be associated with a particular genetic condition, but they can also occur as an isolated condition.  Even in isolated seizures/epilepsy, it is thought that there could be genetic risk factors influencing the chance of seizure disorder in some families.  She reports no other known family history of seizures.        Evelyn reports that her mother lost a pregnancy at around 5 months.  She also reports that her mother-in-law also had a pregnancy loss at " around 5 months.  She reports she is not aware of a particular cause being identified for these losses; she is not aware of either pregnancy being diagnosed with a particular birth defect or genetic issue.  It is hard to be specific about risks to other family members without knowing more about the cause of these losses.        Carrier Screening:   The patient is of  ancestry and her partner is of  ancestry:  Cystic fibrosis is an autosomal recessive genetic condition that occurs with increased frequency in individuals of  ancestry and carrier screening for this condition is available.  In addition,  screening in the [facility] includes cystic fibrosis.    Expanded carrier screening for mutations in a large panel of genes associated with autosomal recessive conditions including cystic fibrosis, spinal muscular atrophy, and others, is now available.    The patient has declined the carrier screening options reviewed today.       Risk Assessment for Chromosome Conditions:   We explained that the risk for fetal chromosome abnormalities increases with maternal age. We discussed specific features of common chromosome abnormalities, including Down syndrome, trisomy 13, trisomy 18, and sex chromosome trisomies.      At age 34 at delivery, the midtrimester risk to have a baby with Down syndrome is 1 in 216.     At age 34 at delivery, the midtrimester risk to have a baby with any chromosome abnormality is 1 in 105.        Testing Options:   We discussed the following options:   First trimester screening    First trimester ultrasound with nuchal translucency and nasal bone assessments, maternal plasma hCG, CLAIRE-A, and AFP measurement    Screens for fetal trisomy 21, trisomy 13, and trisomy 18    Cannot screen for open neural tube defects; maternal serum AFP after 15 weeks is recommended     Non-invasive Prenatal Testing (NIPT)    Maternal plasma cell-free DNA testing; first trimester  ultrasound with nuchal translucency and nasal bone assessment is recommended, when appropriate    Screens for fetal trisomy 21, trisomy 13, trisomy 18, and sex chromosome aneuploidy    Cannot screen for open neural tube defects; maternal serum AFP after 15 weeks is recommended     Chorionic villus sampling (CVS)     Invasive procedure typically performed in the first trimester by which placental villi are obtained for the purpose of chromosome analysis and/or other prenatal genetic analysis    Diagnostic results; >99% sensitivity for fetal chromosome abnormalities    Cannot test for open neural tube defects; maternal serum AFP after 15 weeks is recommended     Genetic Amniocentesis    Invasive procedure typically performed in the second trimester by which amniotic fluid is obtained for the purpose of chromosome analysis and/or other prenatal genetic analysis    Diagnostic results; >99% sensitivity for fetal chromosome abnormalities    AFAFP measurement tests for open neural tube defects     Comprehensive (Level II) ultrasound: Detailed ultrasound performed between 18-22 weeks gestation to screen for major birth defects and markers for aneuploidy.    We reviewed the benefits and limitations of this testing.  Screening tests provide a risk assessment specific to the pregnancy for certain fetal chromosome abnormalities, but cannot definitively diagnose or exclude a fetal chromosome abnormality.  Follow-up genetic counseling and consideration of diagnostic testing is recommended with any abnormal screening result.     Diagnostic tests carry inherent risks- including risk of miscarriage- that require careful consideration.  These tests can detect fetal chromosome abnormalities with greater than 99% certainty.  Results can be compromised by maternal cell contamination or mosaicism, and are limited by the resolution of cytogenetic G-banding technology.  There is no screening nor diagnostic test that can detect all forms of  birth defects or mental disability.     It was a pleasure to be involved with Evelyn s care. Face-to-face time of the meeting was 35 minutes.    Maria Elena Gil MS, [facility]  Maternal Fetal Medicine  [facility]  Phone:[phone]  Email: [email or web address]'

## 2019-03-04 NOTE — PROGRESS NOTES
SUBJECTIVE:    33 year old, female, , 10w5d,  who presents to the clinic today for a new ob visit.    Feels well. Has started PNV taking daily,  Estimated Date of Delivery: Sep 25, 2019   Sep 25, 2019 is calculated from Patient's last menstrual period was 2018..        She has not had bleeding since her LMP.   She has had mild nausea. Weight loss has not occurred.   This was a planned pregnancy.   FOB is involved,  Singh.      OTHER CONCERNS:   Questions regarding taking Vit D.   Mild mid back pain after picking up toddler yesterday.    ===========================================  ROS  PSYCHIATRIC:  Denies mood changes, depression or anxiety  PHQ9: Last PHQ-9 score on record= No Value exists for the : HP#PHQ9  Social History     Tobacco Use     Smoking status: Never Smoker     Smokeless tobacco: Never Used   Substance Use Topics     Alcohol use: No     Alcohol/week: 0.0 oz     Frequency: Never     Comment: 2 a month tryong to conceive soon. stopped in preganncy     History   Drug Use No     History   Smoking Status     Never Smoker   Smokeless Tobacco     Never Used     Social History    Substance and Sexual Activity      Alcohol use: No        Alcohol/week: 0.0 oz        Frequency: Never        Comment: 2 a month tryong to conceive soon. stopped in preganncy    Family History   Problem Relation Age of Onset     Hypertension Mother      Depression Mother      Thyroid Disease Mother      Obesity Mother      Diabetes Maternal Grandmother      Alzheimer Disease Paternal Grandmother      Depression Sister      Obesity Sister      ============================================  MEDICAL HISTORY   No Known Allergies          Current Outpatient Medications:      Docosahexaenoic Acid (PRENATAL DHA PO), Take 1 tablet by mouth daily, Disp: , Rfl:     Current Facility-Administered Medications:      HYDROXYprogesterone caproate (BRAYAN) intramuscular injection 250 mg, 250 mg, Intramuscular, Q7 Days, Brenda  "Kiley Hutchins, SHOAIL [facility]    Past Medical History:   Diagnosis Date     NO ACTIVE PROBLEMS      Uncomplicated asthma Childhood       Past Surgical History:   Procedure Laterality Date     EXTRACTION(S) DENTAL              Obstetric History       T0      L1     SAB0   TAB0   Ectopic0   Multiple0   Live Births1       # Outcome Date GA Lbr Devan/2nd Weight Sex Delivery Anes PTL Lv   2 Current            1  17 36w1d 04:15 / 02:40 2.977 kg (6 lb 9 oz) F Vag-Spont EPI, Local  JAYA      Name: Jerardo      Apgar1:  9                Apgar5: 9          GYN History- Abnormal Pap Smears: NILM/HPV Neg2016, next due 2021                        Cervical procedures: None                        History of STI: None    I personally reviewed the past social/family/medical and surgical history on the date of service.   I reviewed lab work done at Intake visit with patient.    OBJECTIVE:   PHYSICAL EXAM:  BP 99/65   Pulse 78   Ht 1.575 m (5' 2\")   Wt 58.5 kg (128 lb 14.4 oz)   LMP 2018   BMI 23.58 kg/m    BMI- Body mass index is 23.58 kg/m ., GENERAL:  Pleasant pregnant female, alert, cooperative and well groomed.  SKIN:  Warm and dry, without lesions or rashes  HEAD: Symmetrical features.  MOUTH:  Buccal mucosa pink, moist without lesions.  Teeth in good repair.    NECK:  Thyroid without enlargement and nodules.  Lymph nodes not palpable.   LUNGS:  Clear to auscultation.  BREAST:    No dominant, fixed or suspicious masses are noted.  No skin or nipple changes or axillary nodes.   Nipples everted.      HEART:  RRR without murmur.  ABDOMEN: Soft without masses , tenderness or organomegaly.  No CVA tenderness.  Uterus palpable at size equal to dates.  No scars noted. Fetal heart tones present.  MUSCULOSKELETAL:  Full range of motion  EXTREMITIES:  No edema. No significant varicosities.   PELVIC EXAM: Deferred   GC/CHLAMYDIA CULTURE OBTAINED:YES, urine collected    ASSESSMENT:  Intrauterine pregnancy 10w5d " size consistent with dates  Genetic Screening: First Trimester Screening complete this morning. Awaiting NIPT results.   Encounter Diagnoses   Name Primary?     Supervision of pregnancy with history of pre-term labor in third trimester      HRP (high risk pregnancy), second trimester Yes        PLAN:    Orders Placed This Encounter   Medications     DISCONTD: HYDROXYprogesterone caproate (BRAYAN) 250 MG/ML injection     Sig: Inject 1 mL (250 mg) into the muscle every 7 days for 25 doses     Dispense:  1 mL     Refill:  0     HYDROXYprogesterone caproate (BRAYAN) intramuscular injection 250 mg     - Vit D not drawn with Intake labs. Discussed starting 2000iu daily.   - Pt given handout for 17P. Discussed starting at 16-20 weeks and importance of continuing if started.   - Reviewed use of triage nurse line and contacting the on-call provider after hours for an urgent need such as fever, vagina bleeding, bladder or vaginal infection, rupture of membranes,  or term labor.    - Reviewed best evidence for: weight gain for her weight and height for pregnancy:  RECOMMENDED WEIGHT GAIN: 25-35 lbs.   healthy diet and foods to avoid; exercise and activity during pregnancy;avoiding exposure to toxoplasmosis; and maintenance of a generally healthy lifestyle.   - Discussed the harms, benefits, side effects and alternative therapies for current prescribed and OTC medications.  - Discussed warm pack or ice for back pain and proper body mechanics.   - All pt's questions discussed and answered.  Pt verbalized understanding of and agreement to plan of care.   - Continue scheduled prenatal care with next visit in 4 weeks and prn if questions or concerns    Yuliet TORO, lilo serving as a scribe; to document services personally performed by  Kiley Gutierrez CNM based on data collection and the provider's statements to me.     SANFORD Ramos    Answers for HPI/ROS submitted by the patient on 2019   NERY 7 TOTAL SCORE: 0  The  encounter was performed by me and scribed by the SNM. The scribed note accurately reflects my personal services and decisions made by me. SOHAIL AcevesM

## 2019-03-04 NOTE — LETTER
RE: Evelyn Strauss  [address] [facility] [address]     Dear Colleague,    Thank you for referring your patient, Evelyn Strauss, to the [facility] at [facility]. Please see a copy of my visit note below.          SUBJECTIVE:    33 year old, female, , 10w5d,  who presents to the clinic today for a new ob visit.    Feels well. Has started PNV taking daily,  Estimated Date of Delivery: Sep 25, 2019   Sep 25, 2019 is calculated from Patient's last menstrual period was 2018..        She has not had bleeding since her LMP.   She has had mild nausea. Weight loss has not occurred.   This was a planned pregnancy.   FOB is involved,  Singh.      OTHER CONCERNS:   Questions regarding taking Vit D.   Mild mid back pain after picking up toddler yesterday.    ===========================================    Social History     Tobacco Use     Smoking status: Never Smoker     Smokeless tobacco: Never Used   Substance Use Topics     Alcohol use: No     Alcohol/week: 0.0 oz     Frequency: Never     Comment: 2 a month tryong to conceive soon. stopped in preganncy     History   Drug Use No     History   Smoking Status     Never Smoker   Smokeless Tobacco     Never Used     Social History    Substance and Sexual Activity      Alcohol use: No        Alcohol/week: 0.0 oz        Frequency: Never        Comment: 2 a month tryong to conceive soon. stopped in preganncy    Family History   Problem Relation Age of Onset     Hypertension Mother      Depression Mother      Thyroid Disease Mother      Obesity Mother      Diabetes Maternal Grandmother      Alzheimer Disease Paternal Grandmother      Depression Sister      Obesity Sister      ============================================  MEDICAL HISTORY   No Known Allergies          Current Outpatient Medications:      Docosahexaenoic Acid (PRENATAL DHA PO), Take 1 tablet by mouth daily, Disp: , Rfl:     Current  "Facility-Administered Medications:      HYDROXYprogesterone caproate (BRAYAN) intramuscular injection 250 mg, 250 mg, Intramuscular, Q7 Days, Kiley Gutierrez APRN CNM    Past Medical History:   Diagnosis Date     NO ACTIVE PROBLEMS      Uncomplicated asthma Childhood       Past Surgical History:   Procedure Laterality Date     EXTRACTION(S) DENTAL              Obstetric History       T0      L1     SAB0   TAB0   Ectopic0   Multiple0   Live Births1       # Outcome Date GA Lbr Devan/2nd Weight Sex Delivery Anes PTL Lv   2 Current            1  17 36w1d 04:15 / 02:40 2.977 kg (6 lb 9 oz) F Vag-Spont EPI, Local  JAYA      Name: Adalyn      Apgar1:  9                Apgar5: 9          GYN History- Abnormal Pap Smears: NILM/HPV Neg2016, next due 2021                        Cervical procedures: None                        History of STI: None    I personally reviewed the past social/family/medical and surgical history on the date of service.   I reviewed lab work done at Intake visit with patient.    OBJECTIVE:   PHYSICAL EXAM:  BP 99/65   Pulse 78   Ht 1.575 m (5' 2\")   Wt 58.5 kg (128 lb 14.4 oz)   LMP 2018   BMI 23.58 kg/m     BMI- Body mass index is 23.58 kg/m ., GENERAL:  Pleasant pregnant female, alert, cooperative and well groomed.  SKIN:  Warm and dry, without lesions or rashes  HEAD: Symmetrical features.  MOUTH:  Buccal mucosa pink, moist without lesions.  Teeth in good repair.    NECK:  Thyroid without enlargement and nodules.  Lymph nodes not palpable.   LUNGS:  Clear to auscultation.  BREAST:    No dominant, fixed or suspicious masses are noted.  No skin or nipple changes or axillary nodes.   Nipples everted.      HEART:  RRR without murmur.  ABDOMEN: Soft without masses , tenderness or organomegaly.  No CVA tenderness.  Uterus palpable at size equal to dates.  No scars noted. Fetal heart tones present.  MUSCULOSKELETAL:  Full range of motion  EXTREMITIES:  No edema. " No significant varicosities.   PELVIC EXAM: Deferred   GC/CHLAMYDIA CULTURE OBTAINED:YES, urine collected    ASSESSMENT:  Intrauterine pregnancy 10w5d size consistent with dates  Genetic Screening: First Trimester Screening complete this morning. Awaiting NIPT results.   Encounter Diagnoses   Name Primary?     Supervision of pregnancy with history of pre-term labor in third trimester      HRP (high risk pregnancy), second trimester Yes        PLAN:    Orders Placed This Encounter   Medications     DISCONTD: HYDROXYprogesterone caproate (BRAYAN) 250 MG/ML injection     Sig: Inject 1 mL (250 mg) into the muscle every 7 days for 25 doses     Dispense:  1 mL     Refill:  0     HYDROXYprogesterone caproate (BRAYAN) intramuscular injection 250 mg     - Vit D not drawn with Intake labs. Discussed starting 2000iu daily.   - Pt given handout for 17P. Discussed starting at 16-20 weeks and importance of continuing if started.   - Reviewed use of triage nurse line and contacting the on-call provider after hours for an urgent need such as fever, vagina bleeding, bladder or vaginal infection, rupture of membranes,  or term labor.    - Reviewed best evidence for: weight gain for her weight and height for pregnancy:  RECOMMENDED WEIGHT GAIN: 25-35 lbs.   healthy diet and foods to avoid; exercise and activity during pregnancy;avoiding exposure to toxoplasmosis; and maintenance of a generally healthy lifestyle.   - Discussed the harms, benefits, side effects and alternative therapies for current prescribed and OTC medications.  - Discussed warm pack or ice for back pain and proper body mechanics.   - All pt's questions discussed and answered.  Pt verbalized understanding of and agreement to plan of care.   - Continue scheduled prenatal care with next visit in 4 weeks and prn if questions or concerns    Yuliet TORO, am serving as a scribe; to document services personally performed by  Kiley Gutierrez CNM based on data  "collection and the provider's statements to me.     SANFORD Ramos    Evaluation for 17P   Is this current pregnancy a peters pregnancy? Yes  Has this patient experienced a spontaneous,  birth or  rupture of membranes between 20 - 37 weeks of a peters pregnancy? Yes    Both answers are \"Yes\" the patient may be a candidate for \"17P\" Yessy.        Assessment:  Evelyn is a 33 year old  at 10w5d with a history of prior spontaneous peters  birth.  Given this history, Evelyn is at risk for recurrent  birth in this pregnancy.  I discussed the availability of 17-alpha hydroxyprogesterone caproate (17P), which has been demonstrated to reduce the incidence of recurrent  birth and Evelyn does meet criteria for weekly 17P administration in this pregnancy.    Patient has no contraindications to 17P.    Informed patient that 17P requires a commitment to weekly injection which should begin before 20+6 weeks and abrupt discontinuation can increase the risk for  birth.    Plan:   17P 250mg IM weekly beginning between 16-20 weeks through 36 weeks gestation  Serial transvaginal ultrasound for cervical length from 16 through 22-23 weeks gestation  Screen for asymptomatic bacteriuria at first prenatal visit and treat with appropriate antibiotics if present  Smoking risk discussed. Non smoking household.      Again, thank you for allowing me to participate in the care of your patient.      Sincerely,    SOHAIL Aceves CNM      "

## 2019-03-04 NOTE — PATIENT INSTRUCTIONS
BIRTH AND 17-alpha hydroxyprogesterone caproate (17P) TREATMENT    What is  birth?      birth is the delivery of a baby before 37 weeks of gestation.  Approximately 1 in every 12 babies in [place] is born premature (that s approximately 6,000 babies every year)!     birth is often unexpected, but can happen for many reasons. There are some known risk factors, which include:   -pregnancy with twins or triplets   -being  race  -some problems with the uterus or cervix   -some medical problems like high blood pressure   -smoking, drinking, or using illegal drugs while pregnant   -infections like urinary tract infection, bacterial vaginosis and chlamydia while    pregnant  -depression, stress, and anxiety    But the biggest risk factor is having a previous  baby. In fact, women who have one  birth have a 30-50% chance of their next baby coming early too.     What are the risks to a baby that delivers prematurely?     birth is the number one cause of  death worldwide. This risk increases the earlier the baby is born.  Prematurity can also cause serious long term health problems for babies such as breathing problems, infections, bleeding into the brain, as well as long term developmental problems like cerebral palsy, learning impairments, hearing and vision problems.    How can I prevent  birth in my pregnancy?  Overall, the best thing to prevent  delivery is to stay healthy during your pregnancy, and follow up with your doctor for your regular visits and screening tests.  If you have a history of  birth in one of your past pregnancies, you may benefit from weekly injections of 17P.     What is 17P?  The full name is 17-alpha hydroxyprogesterone caproate. This is a form of your body s natural  pregnancy hormone , progesterone. The brand name of this is Yessy, and it is FDA approved for prevention of  birth.  This medication  is given in once weekly injections from week 16-20 through the 36th week of pregnancy. Research shows that women taking 17P can reduce their risk of  birth from 50% to 36%. The treatment has also been shown to reduce the risk of complications after birth, such as bleeding in the brain and need for supplemental oxygen.    It is important to complete the treatment once you start, as the risk of  birth goes up if you stop the injections early.    How can I get started on the Yessy treatment?  First, you should talk with your doctor to find out if this is a good option for you.  It is safe for pregnant women and for the fetus, but if you have some medical problems like uncontrolled blood pressure, breast cancer, or liver disease you should talk about it with your doctor first.  Your clinic will work with you to help determine insurance coverage and preauthorization if needed.  Then you will schedule weekly visits for 17P injections in addition to your routine prenatal visits with your doctor.    Side Effects of Mekena  The most common side effects  reported by [employer] users are   injection site reactions (pain [35%], swelling  [17%], pruritus (itching) [6%], nodule [5%]), urticaria (rash) (12%), pruritus (generalized itching (8%), nausea (6%), and diarrhea (2%).    BIRTH AND 17-alpha hydroxyprogesterone caproate (17P) TREATMENT    What is  birth?      birth is the delivery of a baby before 37 weeks of gestation.  Approximately 1 in every 12 babies in [place] is born premature (that s approximately 6,000 babies every year)!     birth is often unexpected, but can happen for many reasons. There are some known risk factors, which include:   -pregnancy with twins or triplets   -being  race  -some problems with the uterus or cervix   -some medical problems like high blood pressure   -smoking, drinking, or using illegal drugs while pregnant   -infections like urinary tract  infection, bacterial vaginosis and chlamydia while    pregnant  -depression, stress, and anxiety    But the biggest risk factor is having a previous  baby. In fact, women who have one  birth have a 30-50% chance of their next baby coming early too.     What are the risks to a baby that delivers prematurely?     birth is the number one cause of  death worldwide. This risk increases the earlier the baby is born.  Prematurity can also cause serious long term health problems for babies such as breathing problems, infections, bleeding into the brain, as well as long term developmental problems like cerebral palsy, learning impairments, hearing and vision problems.    How can I prevent  birth in my pregnancy?  Overall, the best thing to prevent  delivery is to stay healthy during your pregnancy, and follow up with your doctor for your regular visits and screening tests.  If you have a history of  birth in one of your past pregnancies, you may benefit from weekly injections of 17P.     What is 17P?  The full name is 17-alpha hydroxyprogesterone caproate. This is a form of your body s natural  pregnancy hormone , progesterone. The brand name of this is Yessy, and it is FDA approved for prevention of  birth.  This medication is given in once weekly injections from week 16-20 through the 36th week of pregnancy. Research shows that women taking 17P can reduce their risk of  birth from 50% to 36%. The treatment has also been shown to reduce the risk of complications after birth, such as bleeding in the brain and need for supplemental oxygen.    It is important to complete the treatment once you start, as the risk of  birth goes up if you stop the injections early.    How can I get started on the [place] treatment?  First, you should talk with your doctor to find out if this is a good option for you.  It is safe for pregnant women and for the fetus, but if you  have some medical problems like uncontrolled blood pressure, breast cancer, or liver disease you should talk about it with your doctor first.  Your clinic will work with you to help determine insurance coverage and preauthorization if needed.  Then you will schedule weekly visits for 17P injections in addition to your routine prenatal visits with your doctor.    Side Effects of Mekena  The most common side effects  reported by [facility] users are   injection site reactions (pain [35%], swelling  [17%], pruritus (itching) [6%], nodule [5%]), urticaria (rash) (12%), pruritus (generalized itching (8%), nausea (6%), and diarrhea (2%).

## 2019-03-05 LAB
C TRACH DNA SPEC QL NAA+PROBE: NEGATIVE
N GONORRHOEA DNA SPEC QL NAA+PROBE: NEGATIVE
SPECIMEN SOURCE: NORMAL
SPECIMEN SOURCE: NORMAL

## 2019-03-11 LAB — LAB SCANNED RESULT: NORMAL

## 2019-03-12 ENCOUNTER — TELEPHONE (OUTPATIENT)
Dept: MATERNAL FETAL MEDICINE | Facility: CLINIC | Age: 34
End: 2019-03-12

## 2019-03-12 NOTE — TELEPHONE ENCOUNTER
Called and discussed normal NIPT results with Evelyn. Results indicate NO ANEUPLOIDY DETECTED for chromosomes 21, 18, 13, or sex chromosomes. Fetal sex was not disclosed. This puts her current pregnancy at low risk for Down syndrome, trisomy 18, trisomy 13 and sex chromosome abnormalities. This test is reported to have the following sensitivities: Down syndrome- 99%, trisomy 18- 98%, and trisomy 13- 98%. Although these results are reassuring, this does not replace a standard chromosome analysis from a chorionic villus sampling or amniocentesis.     Plan: Level II ultrasound is recommended, given AMA. MSAFP is the appropriate second trimester screening test for open neural tube defects; the maternal quad screen is not recommended. Her results are available in her Epic chart for her primary OB to review.    Maria Elena Gil MS, [facility]  Maternal Fetal Medicine  [phone]

## 2019-04-01 ENCOUNTER — OFFICE VISIT (OUTPATIENT)
Dept: OBGYN | Facility: CLINIC | Age: 34
End: 2019-04-01
Attending: ADVANCED PRACTICE MIDWIFE
Payer: COMMERCIAL

## 2019-04-01 ENCOUNTER — TRANSCRIBE ORDERS (OUTPATIENT)
Dept: OBGYN | Facility: CLINIC | Age: 34
End: 2019-04-01

## 2019-04-01 VITALS
SYSTOLIC BLOOD PRESSURE: 103 MMHG | WEIGHT: 135.2 LBS | BODY MASS INDEX: 24.88 KG/M2 | DIASTOLIC BLOOD PRESSURE: 65 MMHG | HEIGHT: 62 IN

## 2019-04-01 DIAGNOSIS — O26.90 PREGNANCY RELATED CONDITION, ANTEPARTUM: Primary | ICD-10-CM

## 2019-04-01 DIAGNOSIS — O09.90 HRP (HIGH RISK PREGNANCY), UNSPECIFIED TRIMESTER: Chronic | ICD-10-CM

## 2019-04-01 PROBLEM — O42.919 PRETERM PREMATURE RUPTURE OF MEMBRANES (PPROM) WITH UNKNOWN ONSET OF LABOR: Status: RESOLVED | Noted: 2017-01-23 | Resolved: 2019-04-01

## 2019-04-01 ASSESSMENT — MIFFLIN-ST. JEOR: SCORE: 1256.51

## 2019-04-01 NOTE — PROGRESS NOTES
"14w5d  Subjective:      34 year old  at 14w5d presents for a routine prenatal appointment.       no vaginal bleeding or leakage of fluid.  no contractions or cramping.    negative fetal movement.       No HA, visual changes, RUQ or epigastric pain.   The patient presents with the following concerns: hx of PTB at 36 wks- agreeable to 17OHP and cervical length US. Ordered for MFM, level II US also ordered. Agreeable to AFP- ordered as future for next week.   Pt also c/o small cyst on left labia x3 wks- pea sized, mildly tender, no redness. Not shaving and no hx of cysts,  Taking baths which makes it feel better. Exam: soft, slightly tender pea sized cyst at edge of left labia majora. No redness or exudate. Continue with baths and observation.  Level II US  Scheduled.   Offered AFP- ordered for next week     Objective:  Vitals:    19 0916   BP: 103/65   Weight: 61.3 kg (135 lb 3.2 oz)   Height: 1.575 m (5' 2\")     See OB flowsheet    Assessment/Plan     Encounter Diagnosis   Name Primary?     HRP (high risk pregnancy), unspecified trimester      Orders Placed This Encounter   Procedures     AFP - Alpha Fetoprotein Maternal Screen     MAT FETAL MED CTR REFERRAL-PREGNANCY     MAT FETAL MED CTR REFERRAL-PREGNANCY     No orders of the defined types were placed in this encounter.    - Reviewed total weight gain, encouraged continued healthy diet and exercise.      - Reviewed why/how to contact provider.    Patient education/orders or handouts today:   Return to clinic in 1 weeks for 17 OHP, AFP and cervical length US and prn if questions or concerns.   Danni Hope, SOHAIL MICHAUDM                "

## 2019-04-01 NOTE — LETTER
"2019       RE: Evelyn Strauss  [address] [facility] [address]     Dear Colleague,    Thank you for referring your patient, Evelyn Strauss, to the [facility] at [facility]. Please see a copy of my visit note below.    14w5d  Subjective:      34 year old  at 14w5d presents for a routine prenatal appointment.       no vaginal bleeding or leakage of fluid.  no contractions or cramping.    negative fetal movement.       No HA, visual changes, RUQ or epigastric pain.   The patient presents with the following concerns: hx of PTB at 36 wks- agreeable to 17OHP and cervical length US. Ordered for MFM, level II US also ordered. Agreeable to AFP- ordered as future for next week.   Pt also c/o small cyst on left labia x3 wks- pea sized, mildly tender, no redness. Not shaving and no hx of cysts,  Taking baths which makes it feel better. Exam: soft, slightly tender pea sized cyst at edge of left labia majora. No redness or exudate. Continue with baths and observation.  Level II US  Scheduled.   Offered AFP- ordered for next week     Objective:  Vitals:    19 0916   BP: 103/65   Weight: 61.3 kg (135 lb 3.2 oz)   Height: 1.575 m (5' 2\")     See OB flowsheet    Assessment/Plan     Encounter Diagnosis   Name Primary?     HRP (high risk pregnancy), unspecified trimester      Orders Placed This Encounter   Procedures     AFP - Alpha Fetoprotein Maternal Screen     MAT FETAL MED CTR REFERRAL-PREGNANCY     MAT FETAL MED CTR REFERRAL-PREGNANCY     No orders of the defined types were placed in this encounter.    - Reviewed total weight gain, encouraged continued healthy diet and exercise.      - Reviewed why/how to contact provider.    Patient education/orders or handouts today:   Return to clinic in 1 weeks for 17 OHP, AFP and cervical length US and prn if questions or concerns.   Danni Hope, SOHAIL MICHAUDM    "

## 2019-04-17 ENCOUNTER — ALLIED HEALTH/NURSE VISIT (OUTPATIENT)
Dept: OBGYN | Facility: CLINIC | Age: 34
End: 2019-04-17
Attending: ADVANCED PRACTICE MIDWIFE
Payer: COMMERCIAL

## 2019-04-17 ENCOUNTER — HOSPITAL ENCOUNTER (OUTPATIENT)
Dept: ULTRASOUND IMAGING | Facility: CLINIC | Age: 34
Discharge: HOME | End: 2019-04-17
Attending: ADVANCED PRACTICE MIDWIFE | Admitting: ADVANCED PRACTICE MIDWIFE
Payer: COMMERCIAL

## 2019-04-17 ENCOUNTER — OFFICE VISIT (OUTPATIENT)
Dept: MATERNAL FETAL MEDICINE | Facility: CLINIC | Age: 34
End: 2019-04-17
Attending: ADVANCED PRACTICE MIDWIFE
Payer: COMMERCIAL

## 2019-04-17 DIAGNOSIS — O09.892 HISTORY OF PRETERM DELIVERY, CURRENTLY PREGNANT IN SECOND TRIMESTER: Primary | ICD-10-CM

## 2019-04-17 DIAGNOSIS — O09.90 HRP (HIGH RISK PREGNANCY), UNSPECIFIED TRIMESTER: Primary | ICD-10-CM

## 2019-04-17 DIAGNOSIS — O26.90 PREGNANCY RELATED CONDITION, ANTEPARTUM: ICD-10-CM

## 2019-04-17 PROCEDURE — G0463 HOSPITAL OUTPT CLINIC VISIT: HCPCS | Mod: ZF

## 2019-04-17 PROCEDURE — 25000128 H RX IP 250 OP 636: Mod: ZF | Performed by: ADVANCED PRACTICE MIDWIFE

## 2019-04-17 PROCEDURE — 96372 THER/PROPH/DIAG INJ SC/IM: CPT | Mod: ZF

## 2019-04-17 PROCEDURE — 76817 TRANSVAGINAL US OBSTETRIC: CPT

## 2019-04-17 RX ADMIN — HYDROXYPROGESTERONE CAPROATE 250 MG: 250 INJECTION INTRAMUSCULAR at 11:12

## 2019-04-17 NOTE — PROGRESS NOTES
Please see ultrasound report under imaging tab for details on ultrasound performed today.    Inez Whiting MD  , OB/GYN  Maternal-Fetal Medicine  [email or web address]  [phone] (Academic office)  [phone] (Pager)

## 2019-04-24 ENCOUNTER — ALLIED HEALTH/NURSE VISIT (OUTPATIENT)
Dept: OBGYN | Facility: CLINIC | Age: 34
End: 2019-04-24
Payer: COMMERCIAL

## 2019-04-24 DIAGNOSIS — O09.90 HRP (HIGH RISK PREGNANCY), UNSPECIFIED TRIMESTER: Primary | ICD-10-CM

## 2019-04-24 PROCEDURE — 96372 THER/PROPH/DIAG INJ SC/IM: CPT | Mod: ZF

## 2019-04-24 PROCEDURE — 25000128 H RX IP 250 OP 636: Mod: ZF | Performed by: ADVANCED PRACTICE MIDWIFE

## 2019-04-24 PROCEDURE — G0463 HOSPITAL OUTPT CLINIC VISIT: HCPCS | Mod: ZF

## 2019-04-24 RX ADMIN — HYDROXYPROGESTERONE CAPROATE 250 MG: 250 INJECTION INTRAMUSCULAR at 16:03

## 2019-04-29 ENCOUNTER — OFFICE VISIT (OUTPATIENT)
Dept: MATERNAL FETAL MEDICINE | Facility: CLINIC | Age: 34
End: 2019-04-29
Attending: ADVANCED PRACTICE MIDWIFE
Payer: COMMERCIAL

## 2019-04-29 ENCOUNTER — ALLIED HEALTH/NURSE VISIT (OUTPATIENT)
Dept: OBGYN | Facility: CLINIC | Age: 34
End: 2019-04-29
Payer: COMMERCIAL

## 2019-04-29 ENCOUNTER — HOSPITAL ENCOUNTER (OUTPATIENT)
Dept: ULTRASOUND IMAGING | Facility: CLINIC | Age: 34
Discharge: HOME | End: 2019-04-29
Attending: ADVANCED PRACTICE MIDWIFE | Admitting: OBSTETRICS & GYNECOLOGY
Payer: COMMERCIAL

## 2019-04-29 DIAGNOSIS — O09.892 HISTORY OF PRETERM DELIVERY, CURRENTLY PREGNANT IN SECOND TRIMESTER: Primary | ICD-10-CM

## 2019-04-29 DIAGNOSIS — O09.521 SUPERVISION OF ELDERLY MULTIGRAVIDA IN FIRST TRIMESTER: ICD-10-CM

## 2019-04-29 DIAGNOSIS — O09.213 SUPERVISION OF PREGNANCY WITH HISTORY OF PRE-TERM LABOR IN THIRD TRIMESTER: Primary | ICD-10-CM

## 2019-04-29 DIAGNOSIS — Z36.86 ENCOUNTER FOR ANTENATAL SCREENING FOR CERVICAL LENGTH: ICD-10-CM

## 2019-04-29 DIAGNOSIS — O26.90 PREGNANCY RELATED CONDITION, ANTEPARTUM: ICD-10-CM

## 2019-04-29 PROCEDURE — 25000128 H RX IP 250 OP 636: Mod: ZF | Performed by: ADVANCED PRACTICE MIDWIFE

## 2019-04-29 PROCEDURE — 76817 TRANSVAGINAL US OBSTETRIC: CPT | Performed by: OBSTETRICS & GYNECOLOGY

## 2019-04-29 PROCEDURE — 76811 OB US DETAILED SNGL FETUS: CPT

## 2019-04-29 PROCEDURE — 96372 THER/PROPH/DIAG INJ SC/IM: CPT | Mod: ZF

## 2019-04-29 RX ADMIN — HYDROXYPROGESTERONE CAPROATE 250 MG: 250 INJECTION INTRAMUSCULAR at 08:57

## 2019-04-29 NOTE — PROGRESS NOTES
Please refer to ultrasound report under 'Imaging' Studies of 'Chart Review' tabs.    Earl Pearson M.D.

## 2019-05-10 ENCOUNTER — ALLIED HEALTH/NURSE VISIT (OUTPATIENT)
Dept: OBGYN | Facility: CLINIC | Age: 34
End: 2019-05-10
Payer: COMMERCIAL

## 2019-05-10 DIAGNOSIS — O09.213 SUPERVISION OF PREGNANCY WITH HISTORY OF PRE-TERM LABOR IN THIRD TRIMESTER: Primary | ICD-10-CM

## 2019-05-10 PROCEDURE — 25000128 H RX IP 250 OP 636: Mod: ZF | Performed by: ADVANCED PRACTICE MIDWIFE

## 2019-05-10 RX ADMIN — HYDROXYPROGESTERONE CAPROATE 250 MG: 250 INJECTION INTRAMUSCULAR at 16:12

## 2019-05-10 NOTE — NURSING NOTE
Prior to injection, verified patient identity using patient's name and date of birth.  Due to injection administration, patient instructed to remain in clinic for 15 minutes  afterwards, and to report any adverse reaction to me immediately.    Medroxyprogesterone     Drug Amount Wasted:  None.  Vial/Syringe: Single dose vial  Expiration Date:  9/2020

## 2019-05-15 ENCOUNTER — ALLIED HEALTH/NURSE VISIT (OUTPATIENT)
Dept: OBGYN | Facility: CLINIC | Age: 34
End: 2019-05-15
Payer: COMMERCIAL

## 2019-05-15 ENCOUNTER — HOSPITAL ENCOUNTER (OUTPATIENT)
Dept: ULTRASOUND IMAGING | Facility: CLINIC | Age: 34
Discharge: HOME | End: 2019-05-15
Attending: OBSTETRICS & GYNECOLOGY | Admitting: OBSTETRICS & GYNECOLOGY
Payer: COMMERCIAL

## 2019-05-15 ENCOUNTER — OFFICE VISIT (OUTPATIENT)
Dept: MATERNAL FETAL MEDICINE | Facility: CLINIC | Age: 34
End: 2019-05-15
Attending: OBSTETRICS & GYNECOLOGY
Payer: COMMERCIAL

## 2019-05-15 DIAGNOSIS — O09.213 SUPERVISION OF PREGNANCY WITH HISTORY OF PRE-TERM LABOR IN THIRD TRIMESTER: ICD-10-CM

## 2019-05-15 DIAGNOSIS — O09.892 HISTORY OF PRETERM DELIVERY, CURRENTLY PREGNANT IN SECOND TRIMESTER: Primary | ICD-10-CM

## 2019-05-15 DIAGNOSIS — O09.892 HISTORY OF PRETERM DELIVERY, CURRENTLY PREGNANT IN SECOND TRIMESTER: ICD-10-CM

## 2019-05-15 DIAGNOSIS — O09.90 HRP (HIGH RISK PREGNANCY), UNSPECIFIED TRIMESTER: Primary | ICD-10-CM

## 2019-05-15 PROCEDURE — 25000128 H RX IP 250 OP 636: Mod: ZF | Performed by: ADVANCED PRACTICE MIDWIFE

## 2019-05-15 PROCEDURE — G0463 HOSPITAL OUTPT CLINIC VISIT: HCPCS | Mod: ZF

## 2019-05-15 PROCEDURE — 96372 THER/PROPH/DIAG INJ SC/IM: CPT | Mod: ZF

## 2019-05-15 PROCEDURE — 76817 TRANSVAGINAL US OBSTETRIC: CPT

## 2019-05-15 RX ADMIN — HYDROXYPROGESTERONE CAPROATE 250 MG: 250 INJECTION INTRAMUSCULAR at 09:05

## 2019-05-15 NOTE — PROGRESS NOTES
Please see ultrasound report under imaging tab for details on ultrasound performed today.    Inez Whiting MD  , OB/GYN  Maternal-Fetal Medicine  [email or web address]  [phone] (Academic office)  [phone] (Pager)

## 2019-05-24 ENCOUNTER — ALLIED HEALTH/NURSE VISIT (OUTPATIENT)
Dept: OBGYN | Facility: CLINIC | Age: 34
End: 2019-05-24
Payer: COMMERCIAL

## 2019-05-24 DIAGNOSIS — O09.90 HRP (HIGH RISK PREGNANCY), UNSPECIFIED TRIMESTER: Primary | ICD-10-CM

## 2019-05-24 PROCEDURE — 96372 THER/PROPH/DIAG INJ SC/IM: CPT | Mod: ZF

## 2019-05-24 PROCEDURE — 40000269 ZZH STATISTIC NO CHARGE FACILITY FEE: Mod: ZF

## 2019-05-24 PROCEDURE — 25000128 H RX IP 250 OP 636: Mod: ZF | Performed by: ADVANCED PRACTICE MIDWIFE

## 2019-05-24 RX ADMIN — HYDROXYPROGESTERONE CAPROATE 250 MG: 250 INJECTION INTRAMUSCULAR at 16:04

## 2019-05-24 NOTE — NURSING NOTE
Chief Complaint   Patient presents with     Allied Health Visit     Rosalia Nobles LPN      Prior to injection, verified patient identity using patient's name and date of birth.  Due to injection administration, patient instructed to remain in clinic for 15 minutes  afterwards, and to report any adverse reaction to me immediately.    Hydroxyprogestrone    Drug Amount Wasted:  None.  Vial/Syringe: Single dose vial  Expiration Date:  10/2020

## 2019-05-31 ENCOUNTER — OFFICE VISIT (OUTPATIENT)
Dept: MATERNAL FETAL MEDICINE | Facility: CLINIC | Age: 34
End: 2019-05-31
Attending: OBSTETRICS & GYNECOLOGY
Payer: COMMERCIAL

## 2019-05-31 ENCOUNTER — ALLIED HEALTH/NURSE VISIT (OUTPATIENT)
Dept: OBGYN | Facility: CLINIC | Age: 34
End: 2019-05-31
Payer: COMMERCIAL

## 2019-05-31 ENCOUNTER — HOSPITAL ENCOUNTER (OUTPATIENT)
Dept: ULTRASOUND IMAGING | Facility: CLINIC | Age: 34
Discharge: HOME | End: 2019-05-31
Attending: OBSTETRICS & GYNECOLOGY | Admitting: OBSTETRICS & GYNECOLOGY
Payer: COMMERCIAL

## 2019-05-31 DIAGNOSIS — O09.892 HISTORY OF PRETERM DELIVERY, CURRENTLY PREGNANT IN SECOND TRIMESTER: ICD-10-CM

## 2019-05-31 DIAGNOSIS — O09.892 HISTORY OF PRETERM DELIVERY, CURRENTLY PREGNANT IN SECOND TRIMESTER: Primary | ICD-10-CM

## 2019-05-31 DIAGNOSIS — O09.92 HRP (HIGH RISK PREGNANCY), SECOND TRIMESTER: Primary | ICD-10-CM

## 2019-05-31 PROCEDURE — 25000128 H RX IP 250 OP 636: Mod: ZF | Performed by: ADVANCED PRACTICE MIDWIFE

## 2019-05-31 PROCEDURE — 76817 TRANSVAGINAL US OBSTETRIC: CPT

## 2019-05-31 PROCEDURE — 96372 THER/PROPH/DIAG INJ SC/IM: CPT | Mod: ZF

## 2019-05-31 PROCEDURE — 40000269 ZZH STATISTIC NO CHARGE FACILITY FEE: Mod: ZF

## 2019-05-31 RX ADMIN — HYDROXYPROGESTERONE CAPROATE 250 MG: 250 INJECTION INTRAMUSCULAR at 11:22

## 2019-05-31 NOTE — PROGRESS NOTES
Please see full imaging report from [employer] program under imaging tab.    Jakob Tirado MD  Maternal Fetal Medicine

## 2019-05-31 NOTE — NURSING NOTE
Chief Complaint   Patient presents with     Allied Health Visit     Houstonia injection       See LIZZ Aguilar 5/31/2019    Prior to injection, verified patient identity using patient's name and date of birth.  Due to injection administration, patient instructed to remain in clinic for 15 minutes  afterwards, and to report any adverse reaction to me immediately.    Houstonia 250mg/mL    Drug Amount Wasted:  None.  Vial/Syringe: Single dose vial  Expiration Date:  10/2020

## 2019-06-06 ENCOUNTER — ALLIED HEALTH/NURSE VISIT (OUTPATIENT)
Dept: OBGYN | Facility: CLINIC | Age: 34
End: 2019-06-06
Payer: COMMERCIAL

## 2019-06-06 DIAGNOSIS — O09.213 SUPERVISION OF PREGNANCY WITH HISTORY OF PRE-TERM LABOR IN THIRD TRIMESTER: Primary | ICD-10-CM

## 2019-06-06 PROCEDURE — 96372 THER/PROPH/DIAG INJ SC/IM: CPT | Mod: ZF

## 2019-06-06 PROCEDURE — 25000128 H RX IP 250 OP 636: Mod: ZF | Performed by: ADVANCED PRACTICE MIDWIFE

## 2019-06-06 RX ADMIN — HYDROXYPROGESTERONE CAPROATE 250 MG: 250 INJECTION INTRAMUSCULAR at 15:51

## 2019-06-14 ENCOUNTER — ALLIED HEALTH/NURSE VISIT (OUTPATIENT)
Dept: OBGYN | Facility: CLINIC | Age: 34
End: 2019-06-14
Payer: COMMERCIAL

## 2019-06-14 DIAGNOSIS — O09.213 SUPERVISION OF PREGNANCY WITH HISTORY OF PRE-TERM LABOR IN THIRD TRIMESTER: Primary | ICD-10-CM

## 2019-06-14 PROCEDURE — 25000128 H RX IP 250 OP 636: Mod: ZF | Performed by: ADVANCED PRACTICE MIDWIFE

## 2019-06-14 PROCEDURE — 40000269 ZZH STATISTIC NO CHARGE FACILITY FEE: Mod: ZF

## 2019-06-14 PROCEDURE — 96372 THER/PROPH/DIAG INJ SC/IM: CPT | Mod: ZF

## 2019-06-14 RX ADMIN — HYDROXYPROGESTERONE CAPROATE 250 MG: 250 INJECTION INTRAMUSCULAR at 15:58

## 2019-06-14 NOTE — NURSING NOTE
Chief Complaint   Patient presents with     Allied Health Visit     Prior to injection, verified patient identity using patient's name and date of birth.  Due to injection administration, patient instructed to remain in clinic for 15 minutes  afterwards, and to report any adverse reaction to me immediately.    hydroxprogesterone    Drug Amount Wasted:  None.  Vial/Syringe: Single dose vial  Expiration Date:  07/2020

## 2019-06-21 ENCOUNTER — ALLIED HEALTH/NURSE VISIT (OUTPATIENT)
Dept: OBGYN | Facility: CLINIC | Age: 34
End: 2019-06-21
Payer: COMMERCIAL

## 2019-06-21 DIAGNOSIS — O09.213 SUPERVISION OF PREGNANCY WITH HISTORY OF PRE-TERM LABOR IN THIRD TRIMESTER: Primary | ICD-10-CM

## 2019-06-21 PROCEDURE — 96372 THER/PROPH/DIAG INJ SC/IM: CPT | Mod: ZF

## 2019-06-21 PROCEDURE — 40000269 ZZH STATISTIC NO CHARGE FACILITY FEE: Mod: ZF

## 2019-06-21 PROCEDURE — 25000128 H RX IP 250 OP 636: Mod: ZF | Performed by: ADVANCED PRACTICE MIDWIFE

## 2019-06-21 RX ADMIN — HYDROXYPROGESTERONE CAPROATE 250 MG: 250 INJECTION INTRAMUSCULAR at 15:20

## 2019-06-21 NOTE — NURSING NOTE
Chief Complaint   Patient presents with     Allied Health Visit     Prior to injection, verified patient identity using patient's name and date of birth.  Due to injection administration, patient instructed to remain in clinic for 15 minutes  afterwards, and to report any adverse reaction to me immediately.    hydroxyprogesterone    Drug Amount Wasted:  None.  Vial/Syringe: Single dose vial  Expiration Date:  07/2020

## 2019-06-24 ENCOUNTER — ALLIED HEALTH/NURSE VISIT (OUTPATIENT)
Dept: OBGYN | Facility: CLINIC | Age: 34
End: 2019-06-24
Payer: COMMERCIAL

## 2019-06-24 DIAGNOSIS — O09.213 SUPERVISION OF PREGNANCY WITH HISTORY OF PRE-TERM LABOR IN THIRD TRIMESTER: Primary | ICD-10-CM

## 2019-06-24 PROCEDURE — 96372 THER/PROPH/DIAG INJ SC/IM: CPT | Mod: ZF

## 2019-06-24 PROCEDURE — 25000128 H RX IP 250 OP 636: Mod: ZF | Performed by: ADVANCED PRACTICE MIDWIFE

## 2019-06-24 RX ADMIN — HYDROXYPROGESTERONE CAPROATE 250 MG: 250 INJECTION INTRAMUSCULAR at 08:56

## 2019-06-24 NOTE — NURSING NOTE
Chief Complaint   Patient presents with     Allied Health Visit     Rosalia Nobles LPN        Prior to injection, verified patient identity using patient's name and date of birth.  Due to injection administration, patient instructed to remain in clinic for 15 minutes  afterwards, and to report any adverse reaction to me immediately.    Hydroxyprogestrone    Drug Amount Wasted:  None.  Vial/Syringe: Single dose vial  Expiration Date:  07/2020

## 2019-07-01 ENCOUNTER — OFFICE VISIT (OUTPATIENT)
Dept: OBGYN | Facility: CLINIC | Age: 34
End: 2019-07-01
Attending: INTERNAL MEDICINE
Payer: COMMERCIAL

## 2019-07-01 VITALS
HEART RATE: 96 BPM | DIASTOLIC BLOOD PRESSURE: 70 MMHG | SYSTOLIC BLOOD PRESSURE: 107 MMHG | BODY MASS INDEX: 28.17 KG/M2 | WEIGHT: 154 LBS

## 2019-07-01 DIAGNOSIS — O09.90 HRP (HIGH RISK PREGNANCY), UNSPECIFIED TRIMESTER: Primary | ICD-10-CM

## 2019-07-01 PROBLEM — R73.09 ABNORMAL GLUCOSE TOLERANCE TEST: Status: ACTIVE | Noted: 2019-07-01

## 2019-07-01 LAB
DEPRECATED CALCIDIOL+CALCIFEROL SERPL-MC: 31 UG/L (ref 20–75)
ERYTHROCYTE [DISTWIDTH] IN BLOOD BY AUTOMATED COUNT: 13.1 % (ref 10–15)
GLUCOSE 1H P 50 G GLC PO SERPL-MCNC: 180 MG/DL (ref 60–129)
HCT VFR BLD AUTO: 32.7 % (ref 35–47)
HCV AB SERPL QL IA: NONREACTIVE
HGB BLD-MCNC: 10.8 G/DL (ref 11.7–15.7)
MCH RBC QN AUTO: 30.3 PG (ref 26.5–33)
MCHC RBC AUTO-ENTMCNC: 33 G/DL (ref 31.5–36.5)
MCV RBC AUTO: 92 FL (ref 78–100)
PLATELET # BLD AUTO: 195 10E9/L (ref 150–450)
RBC # BLD AUTO: 3.57 10E12/L (ref 3.8–5.2)
T PALLIDUM AB SER QL: NONREACTIVE
WBC # BLD AUTO: 9.8 10E9/L (ref 4–11)

## 2019-07-01 PROCEDURE — 86803 HEPATITIS C AB TEST: CPT | Performed by: ADVANCED PRACTICE MIDWIFE

## 2019-07-01 PROCEDURE — 82306 VITAMIN D 25 HYDROXY: CPT | Performed by: ADVANCED PRACTICE MIDWIFE

## 2019-07-01 PROCEDURE — 90715 TDAP VACCINE 7 YRS/> IM: CPT | Mod: ZF

## 2019-07-01 PROCEDURE — 96372 THER/PROPH/DIAG INJ SC/IM: CPT | Mod: ZF

## 2019-07-01 PROCEDURE — G0463 HOSPITAL OUTPT CLINIC VISIT: HCPCS | Mod: ZF,25

## 2019-07-01 PROCEDURE — 25000128 H RX IP 250 OP 636: Mod: ZF

## 2019-07-01 PROCEDURE — 86780 TREPONEMA PALLIDUM: CPT | Performed by: ADVANCED PRACTICE MIDWIFE

## 2019-07-01 PROCEDURE — 85027 COMPLETE CBC AUTOMATED: CPT | Performed by: ADVANCED PRACTICE MIDWIFE

## 2019-07-01 PROCEDURE — 90471 IMMUNIZATION ADMIN: CPT | Mod: ZF

## 2019-07-01 PROCEDURE — 36415 COLL VENOUS BLD VENIPUNCTURE: CPT | Performed by: ADVANCED PRACTICE MIDWIFE

## 2019-07-01 PROCEDURE — 25000128 H RX IP 250 OP 636: Mod: ZF | Performed by: ADVANCED PRACTICE MIDWIFE

## 2019-07-01 PROCEDURE — 82950 GLUCOSE TEST: CPT | Performed by: ADVANCED PRACTICE MIDWIFE

## 2019-07-01 RX ADMIN — HYDROXYPROGESTERONE CAPROATE 250 MG: 250 INJECTION INTRAMUSCULAR at 08:47

## 2019-07-01 ASSESSMENT — ANXIETY QUESTIONNAIRES
3. WORRYING TOO MUCH ABOUT DIFFERENT THINGS: NOT AT ALL
5. BEING SO RESTLESS THAT IT IS HARD TO SIT STILL: NOT AT ALL
GAD7 TOTAL SCORE: 0
1. FEELING NERVOUS, ANXIOUS, OR ON EDGE: NOT AT ALL
7. FEELING AFRAID AS IF SOMETHING AWFUL MIGHT HAPPEN: NOT AT ALL
2. NOT BEING ABLE TO STOP OR CONTROL WORRYING: NOT AT ALL
6. BECOMING EASILY ANNOYED OR IRRITABLE: NOT AT ALL

## 2019-07-01 ASSESSMENT — PATIENT HEALTH QUESTIONNAIRE - PHQ9
5. POOR APPETITE OR OVEREATING: NOT AT ALL
SUM OF ALL RESPONSES TO PHQ QUESTIONS 1-9: 2

## 2019-07-01 NOTE — LETTER
2019      RE: Evelyn Strauss  [address] [facility] [address]      34 year old, , 27w5d, presents for EOB visit.    Patient concerns: Feeling well overall.  No concerns. Surprise sex of baby. Continuing to get 17P injections weekly.   Completed all cervical length ultrasounds with MFM- all WNL.     Denies cramping/contractions, vaginal bleeding, discharge or leakage of fluid. Reports +fetal movement.  No HA, vision changes, ruq/epigastric pain.      Education completed today includes breast feeding, [facility] hand out , contraception, counting movements, signs of pre-term labor, when to present to birthplace, post partum depression, GBS, getting enough iron and labor induction.  Birth preferences reviewed: Medicated- epidural  Had epidural in last labor.  birth- PPROM @ 36+1.  Desires skin to skin, delayed cord clamping, dad to cut cord.   Labor support:      Feeding plans : Breastfeeding   x 7 months, pump 14 months    Contraception planned:  condoms  The following labs were ordered today:   GCT, CBC w platelets, Vitamin d, Anti-treponema, Hep C  Water birth consent form was not given- not candidate.    Blood type:   ABO   Date Value Ref Range Status   2019 B  Final     RH(D)   Date Value Ref Range Status   2019 Pos  Final     Antibody Screen   Date Value Ref Range Status   2019 Neg  Final   Rhogam  was not given.  TDAP  was given.    A/P:  Encounter Diagnosis   Name Primary?     HRP (high risk pregnancy), unspecified trimester Yes     Orders Placed This Encounter   Procedures     Glucose 1 Hour     CBC with Platelets     Treponema Abs w Reflex to RPR and Titer     25- OH-Vitamin D     EOB education reviewed.  EOB labs ordered- 1 hour glucose, cbc with plts, anti-trep, vitamin D, Hep C.   Tdap given.  17P administered. Continue weekly injections until 36th week.     Continue scheduled prenatal care, RTC in 2 weeks for LISA and weekly RN visit for 17P and  prn if questions or concerns.      Faizan Olsen, SOHAIL, CNM

## 2019-07-01 NOTE — PROGRESS NOTES
34 year old, , 27w5d, presents for EOB visit.    Patient concerns: Feeling well overall.  No concerns. Surprise sex of baby. Continuing to get 17P injections weekly.   Completed all cervical length ultrasounds with MFM- all WNL.     Denies cramping/contractions, vaginal bleeding, discharge or leakage of fluid. Reports +fetal movement.  No HA, vision changes, ruq/epigastric pain.      Education completed today includes breast feeding, [facility] hand out , contraception, counting movements, signs of pre-term labor, when to present to birthplace, post partum depression, GBS, getting enough iron and labor induction.  Birth preferences reviewed: Medicated- epidural  Had epidural in last labor.  birth- PPROM @ 36+1.  Desires skin to skin, delayed cord clamping, dad to cut cord.   Labor support:     [place] Feeding plans : Breastfeeding   x 7 months, pump 14 months    Contraception planned:  condoms  The following labs were ordered today:        GCT, CBC w platelets, Vitamin d, Anti-treponema, Hep C  Water birth consent form was not given- not candidate.    Blood type:   ABO   Date Value Ref Range Status   2019 B  Final     RH(D)   Date Value Ref Range Status   2019 Pos  Final     Antibody Screen   Date Value Ref Range Status   2019 Neg  Final   Rhogam  was not given.  TDAP  was given.    A/P:  Encounter Diagnosis   Name Primary?     HRP (high risk pregnancy), unspecified trimester Yes     Orders Placed This Encounter   Procedures     Glucose 1 Hour     CBC with Platelets     Treponema Abs w Reflex to RPR and Titer     25- OH-Vitamin D     EOB education reviewed.  EOB labs ordered- 1 hour glucose, cbc with plts, anti-trep, vitamin D, Hep C.   Tdap given.  17P administered. Continue weekly injections until 36th week.     Continue scheduled prenatal care, RTC in 2 weeks for LISA and weekly RN visit for 17P and prn if questions or concerns.      SOHAIL Brooks, SMITHA

## 2019-07-01 NOTE — LETTER
2019       RE: Evelyn Strauss  [address] [facility] [address]     Dear Colleague,    Thank you for referring your patient, Evelyn Strauss, to the [facility] at [facility]. Please see a copy of my visit note below.     34 year old, , 27w5d, presents for EOB visit.    Patient concerns: Feeling well overall.  No concerns. Surprise sex of baby. Continuing to get 17P injections weekly.   Completed all cervical length ultrasounds with MFM- all WNL.     Denies cramping/contractions, vaginal bleeding, discharge or leakage of fluid. Reports +fetal movement.  No HA, vision changes, ruq/epigastric pain.      Education completed today includes breast feeding, [facility] hand out , contraception, counting movements, signs of pre-term labor, when to present to birthplace, post partum depression, GBS, getting enough iron and labor induction.  Birth preferences reviewed: Medicated- epidural  Had epidural in last labor.  birth- PPROM @ 36+1.  Desires skin to skin, delayed cord clamping, dad to cut cord.   Labor support:     [place] Feeding plans : Breastfeeding   x 7 months, pump 14 months    Contraception planned:  condoms  The following labs were ordered today:        GCT, CBC w platelets, Vitamin d, Anti-treponema, Hep C  Water birth consent form was not given- not candidate.    Blood type:   ABO   Date Value Ref Range Status   2019 B  Final     RH(D)   Date Value Ref Range Status   2019 Pos  Final     Antibody Screen   Date Value Ref Range Status   2019 Neg  Final   Rhogam  was not given.  TDAP  was given.    A/P:  Encounter Diagnosis   Name Primary?     HRP (high risk pregnancy), unspecified trimester Yes     Orders Placed This Encounter   Procedures     Glucose 1 Hour     CBC with Platelets     Treponema Abs w Reflex to RPR and Titer     25- OH-Vitamin D     EOB education reviewed.  EOB labs ordered- 1 hour glucose,  cbc with plts, anti-trep, vitamin D, Hep C.   Tdap given.  17P administered. Continue weekly injections until 36th week.     Continue scheduled prenatal care, RTC in 2 weeks for LISA and weekly RN visit for 17P and prn if questions or concerns.      SOHAIL Brooks CNM     Again, thank you for allowing me to participate in the care of your patient.      Sincerely,    Faizan Olsen CNM

## 2019-07-01 NOTE — NURSING NOTE
Chief Complaint   Patient presents with     Prenatal Care     LISA 27 weeks and 5 days           Prior to injection, verified patient identity using patient's name and date of birth.  Due to injection administration, patient instructed to remain in clinic for 15 minutes  afterwards, and to report any adverse reaction to me immediately.    Hydroxyprogestrone    Drug Amount Wasted:  None.  Vial/Syringe: Single dose vial  Expiration Date:  10/2020    Izabella Nobles LPN

## 2019-07-02 DIAGNOSIS — O09.90 HRP (HIGH RISK PREGNANCY), UNSPECIFIED TRIMESTER: Primary | ICD-10-CM

## 2019-07-02 ASSESSMENT — ANXIETY QUESTIONNAIRES: GAD7 TOTAL SCORE: 0

## 2019-07-10 ENCOUNTER — ALLIED HEALTH/NURSE VISIT (OUTPATIENT)
Dept: OBGYN | Facility: CLINIC | Age: 34
End: 2019-07-10
Payer: COMMERCIAL

## 2019-07-10 PROCEDURE — 96372 THER/PROPH/DIAG INJ SC/IM: CPT | Mod: ZF

## 2019-07-10 PROCEDURE — 25000128 H RX IP 250 OP 636: Mod: ZF | Performed by: ADVANCED PRACTICE MIDWIFE

## 2019-07-10 PROCEDURE — 40000269 ZZH STATISTIC NO CHARGE FACILITY FEE: Mod: ZF

## 2019-07-10 RX ADMIN — HYDROXYPROGESTERONE CAPROATE 250 MG: 250 INJECTION INTRAMUSCULAR at 14:41

## 2019-07-10 NOTE — NURSING NOTE
Clinic Administered Medication Documentation      Injectable Medication Documentation    Patient was given Hydroxyprogesterone. Prior to medication administration, verified patients identity using patient s name and date of birth. Please see MAR and medication order for additional information. Patient instructed to remain in clinic for 15 minutes.      Was entire vial of medication used? Yes  Vial/Syringe: Single dose vial  Expiration Date:  10/2020  Was this medication supplied by the patient? No

## 2019-07-15 DIAGNOSIS — O09.90 HRP (HIGH RISK PREGNANCY), UNSPECIFIED TRIMESTER: ICD-10-CM

## 2019-07-15 LAB
GLUCOSE 1H P 100 G GLC PO SERPL-MCNC: 144 MG/DL (ref 60–179)
GLUCOSE 2H P 100 G GLC PO SERPL-MCNC: 154 MG/DL (ref 60–154)
GLUCOSE 3H P 100 G GLC PO SERPL-MCNC: 117 MG/DL (ref 60–139)
GLUCOSE P FAST SERPL-MCNC: 79 MG/DL (ref 60–94)

## 2019-07-15 PROCEDURE — 82951 GLUCOSE TOLERANCE TEST (GTT): CPT | Performed by: ADVANCED PRACTICE MIDWIFE

## 2019-07-15 PROCEDURE — 82952 GTT-ADDED SAMPLES: CPT | Performed by: ADVANCED PRACTICE MIDWIFE

## 2019-07-15 PROCEDURE — 36415 COLL VENOUS BLD VENIPUNCTURE: CPT | Performed by: ADVANCED PRACTICE MIDWIFE

## 2019-07-16 ENCOUNTER — OFFICE VISIT (OUTPATIENT)
Dept: OBGYN | Facility: CLINIC | Age: 34
End: 2019-07-16
Attending: ADVANCED PRACTICE MIDWIFE
Payer: COMMERCIAL

## 2019-07-16 VITALS
HEIGHT: 62 IN | DIASTOLIC BLOOD PRESSURE: 61 MMHG | HEART RATE: 90 BPM | SYSTOLIC BLOOD PRESSURE: 96 MMHG | WEIGHT: 154.1 LBS | BODY MASS INDEX: 28.36 KG/M2

## 2019-07-16 DIAGNOSIS — O09.90 HRP (HIGH RISK PREGNANCY), UNSPECIFIED TRIMESTER: Primary | ICD-10-CM

## 2019-07-16 DIAGNOSIS — D50.8 IRON DEFICIENCY ANEMIA SECONDARY TO INADEQUATE DIETARY IRON INTAKE: ICD-10-CM

## 2019-07-16 DIAGNOSIS — R73.09 ABNORMAL GLUCOSE TOLERANCE TEST: ICD-10-CM

## 2019-07-16 PROBLEM — D64.9 ANEMIA: Status: ACTIVE | Noted: 2019-07-16

## 2019-07-16 PROCEDURE — G0463 HOSPITAL OUTPT CLINIC VISIT: HCPCS | Mod: ZF

## 2019-07-16 PROCEDURE — 96372 THER/PROPH/DIAG INJ SC/IM: CPT | Mod: ZF

## 2019-07-16 PROCEDURE — 25000128 H RX IP 250 OP 636: Mod: ZF | Performed by: ADVANCED PRACTICE MIDWIFE

## 2019-07-16 RX ADMIN — HYDROXYPROGESTERONE CAPROATE 250 MG: 250 INJECTION INTRAMUSCULAR at 09:47

## 2019-07-16 ASSESSMENT — ANXIETY QUESTIONNAIRES
1. FEELING NERVOUS, ANXIOUS, OR ON EDGE: NOT AT ALL
2. NOT BEING ABLE TO STOP OR CONTROL WORRYING: NOT AT ALL
GAD7 TOTAL SCORE: 0
7. FEELING AFRAID AS IF SOMETHING AWFUL MIGHT HAPPEN: NOT AT ALL
3. WORRYING TOO MUCH ABOUT DIFFERENT THINGS: NOT AT ALL
6. BECOMING EASILY ANNOYED OR IRRITABLE: NOT AT ALL
5. BEING SO RESTLESS THAT IT IS HARD TO SIT STILL: NOT AT ALL

## 2019-07-16 ASSESSMENT — MIFFLIN-ST. JEOR: SCORE: 1342.24

## 2019-07-16 ASSESSMENT — PATIENT HEALTH QUESTIONNAIRE - PHQ9: 5. POOR APPETITE OR OVEREATING: NOT AT ALL

## 2019-07-16 NOTE — PROGRESS NOTES
"Subjective:      34 year old  at 29w6d presentst for a routine prenatal appointment.     Denies cramping/contractions, vaginal bleeding, discharge or leakage of fluid. Reports +fetal movement.  No HA, vision changes, ruq/epigastric pain.      Patient concerns: Feeling well overall. Passed 3 hour glucose test yesterday. No concerns.     Objective:  Vitals:    19 0904   BP: 96/61   BP Location: Left arm   Patient Position: Chair   Pulse: 90   Weight: 69.9 kg (154 lb 1.6 oz)   Height: 1.575 m (5' 2\")     See ob flowsheet    PHQ9=0, GAD7= 0    Assessment/Plan     Encounter Diagnosis   Name Primary?     HRP (high risk pregnancy), unspecified trimester Yes       - Reviewed total weight gain, encouraged continued healthy diet and exercise.  .  Reviewed importance of daily fetal kick count and why/how to contact provider.    - Reviewed why/how to contact provider if headache/visual changes/RUQ or epigastric pain, decreased fetal movement, vaginal bleeding, leakage of fluid or more than 4 contractions in an hour.     Patient education/orders or handouts today:  PTL signs/symptoms    Reviewed EOB labs. Passed 3 hour glucose test.   Reviewed hemoglobin of 10.8. Recommended iron supplementation. Patient declines PO iron supplementation at this time and prefers increasing iron rich foods in her diet. Agreeable to repeat CBC at 36 weeks and supplementation at this time if needed.  17P administered today.   Continue weekly RN visit for 17P until 36th week.    Continue scheduled prenatal care, RTC for weekly RN visits for 17P and for LISA appt in 2 weeks and prn if questions or concerns.      SOHAIL Brooks, SMITHA   "

## 2019-07-16 NOTE — LETTER
"2019       RE: Evelyn Strauss  [address] [facility] [address]     Dear Colleague,    Thank you for referring your patient, Evelyn Strauss, to the [facility] at [facility]. Please see a copy of my visit note below.    Subjective:      34 year old  at 29w6d presentst for a routine prenatal appointment.     Denies cramping/contractions, vaginal bleeding, discharge or leakage of fluid. Reports +fetal movement.  No HA, vision changes, ruq/epigastric pain.      Patient concerns: Feeling well overall. Passed 3 hour glucose test yesterday. No concerns.     Objective:  Vitals:    19 0904   BP: 96/61   BP Location: Left arm   Patient Position: Chair   Pulse: 90   Weight: 69.9 kg (154 lb 1.6 oz)   Height: 1.575 m (5' 2\")     See ob flowsheet    PHQ9=0, GAD7= 0    Assessment/Plan     Encounter Diagnosis   Name Primary?     HRP (high risk pregnancy), unspecified trimester Yes       - Reviewed total weight gain, encouraged continued healthy diet and exercise.  .  Reviewed importance of daily fetal kick count and why/how to contact provider.    - Reviewed why/how to contact provider if headache/visual changes/RUQ or epigastric pain, decreased fetal movement, vaginal bleeding, leakage of fluid or more than 4 contractions in an hour.     Patient education/orders or handouts today:  PTL signs/symptoms    Reviewed EOB labs. Passed 3 hour glucose test.   Reviewed hemoglobin of 10.8. Recommended iron supplementation. Patient declines PO iron supplementation at this time and prefers increasing iron rich foods in her diet. Agreeable to repeat CBC at 36 weeks and supplementation at this time if needed.  17P administered today.   Continue weekly RN visit for 17P until 36th week.    Continue scheduled prenatal care, RTC for weekly RN visits for 17P and for LISA appt in 2 weeks and prn if questions or concerns.      SOHAIL Brooks, SMITHA  "

## 2019-07-16 NOTE — NURSING NOTE
Clinic Administered Medication Documentation      Injectable Medication Documentation    Patient was given hydroxyprogesterone. Prior to medication administration, verified patients identity using patient s name and date of birth. Please see MAR and medication order for additional information. Patient instructed to report any adverse reaction to staff immediately .      Was entire vial of medication used? Yes  Vial/Syringe: Single dose vial  Expiration Date:  027/2020  Was this medication supplied by the patient? No

## 2019-07-17 ASSESSMENT — ANXIETY QUESTIONNAIRES: GAD7 TOTAL SCORE: 0

## 2019-07-24 ENCOUNTER — ALLIED HEALTH/NURSE VISIT (OUTPATIENT)
Dept: OBGYN | Facility: CLINIC | Age: 34
End: 2019-07-24
Payer: COMMERCIAL

## 2019-07-24 VITALS
SYSTOLIC BLOOD PRESSURE: 110 MMHG | DIASTOLIC BLOOD PRESSURE: 67 MMHG | BODY MASS INDEX: 24.35 KG/M2 | HEIGHT: 66 IN | HEART RATE: 90 BPM | WEIGHT: 151.5 LBS

## 2019-07-24 PROCEDURE — 25000128 H RX IP 250 OP 636: Mod: ZF | Performed by: ADVANCED PRACTICE MIDWIFE

## 2019-07-24 PROCEDURE — 96372 THER/PROPH/DIAG INJ SC/IM: CPT | Mod: ZF

## 2019-07-24 RX ADMIN — HYDROXYPROGESTERONE CAPROATE 250 MG: 250 INJECTION INTRAMUSCULAR at 09:33

## 2019-07-24 ASSESSMENT — PAIN SCALES - GENERAL: PAINLEVEL: NO PAIN (0)

## 2019-07-24 ASSESSMENT — MIFFLIN-ST. JEOR: SCORE: 1386.2

## 2019-07-24 NOTE — NURSING NOTE
Clinic Administered Medication Documentation      Injectable Medication Documentation    Patient was given loi. Prior to medication administration, verified patients identity using patient s name and date of birth. Please see MAR and medication order for additional information. Patient instructed to remain in clinic for 15 minutes and report any adverse reaction to staff immediately .      Was entire vial of medication used? Yes  Vial/Syringe: Single dose vial  Expiration Date:  7-2020  Was this medication supplied by the patient? No

## 2019-07-30 ENCOUNTER — OFFICE VISIT (OUTPATIENT)
Dept: OBGYN | Facility: CLINIC | Age: 34
End: 2019-07-30
Attending: ADVANCED PRACTICE MIDWIFE
Payer: COMMERCIAL

## 2019-07-30 VITALS
WEIGHT: 155.7 LBS | SYSTOLIC BLOOD PRESSURE: 94 MMHG | HEIGHT: 66 IN | BODY MASS INDEX: 25.02 KG/M2 | DIASTOLIC BLOOD PRESSURE: 59 MMHG | HEART RATE: 88 BPM

## 2019-07-30 DIAGNOSIS — O09.90 HRP (HIGH RISK PREGNANCY), UNSPECIFIED TRIMESTER: Primary | ICD-10-CM

## 2019-07-30 PROCEDURE — 25000128 H RX IP 250 OP 636: Mod: ZF | Performed by: ADVANCED PRACTICE MIDWIFE

## 2019-07-30 PROCEDURE — 96372 THER/PROPH/DIAG INJ SC/IM: CPT | Mod: ZF

## 2019-07-30 PROCEDURE — G0463 HOSPITAL OUTPT CLINIC VISIT: HCPCS | Mod: ZF

## 2019-07-30 RX ADMIN — HYDROXYPROGESTERONE CAPROATE 250 MG: 250 INJECTION INTRAMUSCULAR at 08:29

## 2019-07-30 ASSESSMENT — MIFFLIN-ST. JEOR: SCORE: 1405.06

## 2019-07-30 ASSESSMENT — PAIN SCALES - GENERAL: PAINLEVEL: NO PAIN (0)

## 2019-07-30 NOTE — NURSING NOTE
Chief Complaint   Patient presents with     Prenatal Care     Clinic Administered Medication Documentation    Clinic Administered Medication Documentation      Injectable Medication Documentation    Patient was given Hydroxyprogesterone. Prior to medication administration, verified patients identity using patient s name and date of birth. Please see MAR and medication order for additional information. Patient instructed to report any adverse reaction to staff immediately .      Was entire vial of medication used? Yes  Vial/Syringe: Single dose vial  Expiration Date:  07/2020  Was this medication supplied by the patient? No

## 2019-07-30 NOTE — PROGRESS NOTES
"Subjective:      34 year old  at 31w6d presentst for a routine prenatal appointment.   Denies vaginal bleeding or leakage of fluid.  Denies contractions. + fetal movement.      No HA, visual changes, RUQ or epigastric pain.   Patient concerns: Feeling well overall. No questions/concerns today.   - 17OHP injection    Objective:  Vitals:    19 0810   BP: 94/59   Pulse: 88   Weight: 70.6 kg (155 lb 11.2 oz)   Height: 1.664 m (5' 5.5\")   See ob flowsheet    Assessment/Plan:   Encounter Diagnosis   Name Primary?     HRP (high risk pregnancy), unspecified trimester Yes     - Reviewed total weight gain, encouraged continued healthy diet and exercise.    - Reviewed importance of daily fetal kick count and why/how to contact provider.  - Reviewed why/how to contact provider if headache/visual changes/RUQ or epigastric pain, decreased fetal movement, vaginal bleeding, leakage of fluid or more than 4 contractions in an hour.  - Patient education/orders or handouts today: PTL signs/symptoms   - Continue weekly 17OHP injections  - Return to clinic in 2 weeks and prn if questions or concerns.   "

## 2019-07-30 NOTE — LETTER
"2019       RE: Evelyn Strauss  [address] [facility] [address]     Dear Colleague,    Thank you for referring your patient, Evelyn Strauss, to the [facility] at [facility]. Please see a copy of my visit note below.    Subjective:      34 year old  at 31w6d presentst for a routine prenatal appointment.   Denies vaginal bleeding or leakage of fluid.  Denies contractions. + fetal movement.      No HA, visual changes, RUQ or epigastric pain.   Patient concerns: Feeling well overall. No questions/concerns today.   - 17OHP injection    Objective:  Vitals:    19 0810   BP: 94/59   Pulse: 88   Weight: 70.6 kg (155 lb 11.2 oz)   Height: 1.664 m (5' 5.5\")   See ob flowsheet    Assessment/Plan:   Encounter Diagnosis   Name Primary?     HRP (high risk pregnancy), unspecified trimester Yes     - Reviewed total weight gain, encouraged continued healthy diet and exercise.    - Reviewed importance of daily fetal kick count and why/how to contact provider.  - Reviewed why/how to contact provider if headache/visual changes/RUQ or epigastric pain, decreased fetal movement, vaginal bleeding, leakage of fluid or more than 4 contractions in an hour.  - Patient education/orders or handouts today: PTL signs/symptoms   - Continue weekly 17OHP injections  - Return to clinic in 2 weeks and prn if questions or concerns.     Again, thank you for allowing me to participate in the care of your patient.      Sincerely,    SOHAIL Kumar CNM      "

## 2019-08-06 ENCOUNTER — ALLIED HEALTH/NURSE VISIT (OUTPATIENT)
Dept: OBGYN | Facility: CLINIC | Age: 34
End: 2019-08-06
Payer: COMMERCIAL

## 2019-08-06 DIAGNOSIS — O09.90 HRP (HIGH RISK PREGNANCY), UNSPECIFIED TRIMESTER: Primary | ICD-10-CM

## 2019-08-06 PROCEDURE — 40000269 ZZH STATISTIC NO CHARGE FACILITY FEE: Mod: ZF

## 2019-08-06 PROCEDURE — 25000128 H RX IP 250 OP 636: Mod: ZF | Performed by: ADVANCED PRACTICE MIDWIFE

## 2019-08-06 PROCEDURE — 96372 THER/PROPH/DIAG INJ SC/IM: CPT | Mod: ZF

## 2019-08-06 RX ADMIN — HYDROXYPROGESTERONE CAPROATE 250 MG: 250 INJECTION INTRAMUSCULAR at 08:49

## 2019-08-06 NOTE — NURSING NOTE
Chief Complaint   Patient presents with     Allied Health Visit     Clinic Administered Medication Documentation      Injectable Medication Documentation    Patient was given hydroxyprogesterone. Prior to medication administration, verified patients identity using patient s name and date of birth. Please see MAR and medication order for additional information. Patient instructed to remain in clinic for 15 minutes.      Was entire vial of medication used? Yes  Vial/Syringe: Single dose vial  Expiration Date:  09/2020  Was this medication supplied by the patient? No

## 2019-08-12 ENCOUNTER — OFFICE VISIT (OUTPATIENT)
Dept: OBGYN | Facility: CLINIC | Age: 34
End: 2019-08-12
Attending: ADVANCED PRACTICE MIDWIFE
Payer: COMMERCIAL

## 2019-08-12 ENCOUNTER — HOSPITAL ENCOUNTER (OUTPATIENT)
Facility: CLINIC | Age: 34
Discharge: HOME | End: 2019-08-12
Attending: ADVANCED PRACTICE MIDWIFE | Admitting: ADVANCED PRACTICE MIDWIFE
Payer: COMMERCIAL

## 2019-08-12 ENCOUNTER — TELEPHONE (OUTPATIENT)
Dept: OBGYN | Facility: CLINIC | Age: 34
End: 2019-08-12

## 2019-08-12 VITALS
TEMPERATURE: 97.6 F | WEIGHT: 166 LBS | RESPIRATION RATE: 18 BRPM | SYSTOLIC BLOOD PRESSURE: 108 MMHG | HEIGHT: 62 IN | HEART RATE: 88 BPM | BODY MASS INDEX: 30.55 KG/M2 | OXYGEN SATURATION: 98 % | DIASTOLIC BLOOD PRESSURE: 66 MMHG

## 2019-08-12 VITALS
DIASTOLIC BLOOD PRESSURE: 62 MMHG | WEIGHT: 164 LBS | HEIGHT: 66 IN | HEART RATE: 81 BPM | BODY MASS INDEX: 26.36 KG/M2 | SYSTOLIC BLOOD PRESSURE: 94 MMHG

## 2019-08-12 DIAGNOSIS — O09.90 HRP (HIGH RISK PREGNANCY), UNSPECIFIED TRIMESTER: Chronic | ICD-10-CM

## 2019-08-12 DIAGNOSIS — O09.90 HRP (HIGH RISK PREGNANCY), UNSPECIFIED TRIMESTER: Primary | Chronic | ICD-10-CM

## 2019-08-12 DIAGNOSIS — N76.0 BACTERIAL VAGINOSIS: Primary | ICD-10-CM

## 2019-08-12 DIAGNOSIS — B96.89 BACTERIAL VAGINOSIS: Primary | ICD-10-CM

## 2019-08-12 DIAGNOSIS — O09.90 HRP (HIGH RISK PREGNANCY), UNSPECIFIED TRIMESTER: Primary | ICD-10-CM

## 2019-08-12 PROBLEM — Z36.89 ENCOUNTER FOR TRIAGE IN PREGNANT PATIENT: Status: ACTIVE | Noted: 2019-08-12

## 2019-08-12 LAB
ALBUMIN UR-MCNC: NEGATIVE MG/DL
APPEARANCE UR: CLEAR
BACTERIA #/AREA URNS HPF: ABNORMAL /HPF
BILIRUB UR QL STRIP: NEGATIVE
COLOR UR AUTO: ABNORMAL
FIBRONECTIN FETAL VAG QL: NEGATIVE
GLUCOSE UR STRIP-MCNC: 70 MG/DL
HGB UR QL STRIP: NEGATIVE
KETONES UR STRIP-MCNC: NEGATIVE MG/DL
LEUKOCYTE ESTERASE UR QL STRIP: ABNORMAL
MUCOUS THREADS #/AREA URNS LPF: PRESENT /LPF
NITRATE UR QL: NEGATIVE
PH UR STRIP: 7 PH (ref 5–7)
RBC #/AREA URNS AUTO: 1 /HPF (ref 0–2)
RUPTURE OF FETAL MEMBRANES BY ROM PLUS: NEGATIVE
SOURCE: ABNORMAL
SP GR UR STRIP: 1.01 (ref 1–1.03)
SPECIMEN SOURCE: ABNORMAL
SQUAMOUS #/AREA URNS AUTO: 1 /HPF (ref 0–1)
TRANS CELLS #/AREA URNS HPF: <1 /HPF (ref 0–1)
UROBILINOGEN UR STRIP-MCNC: NORMAL MG/DL (ref 0–2)
WBC #/AREA URNS AUTO: 3 /HPF (ref 0–5)
WET PREP SPEC: ABNORMAL

## 2019-08-12 PROCEDURE — 87661 TRICHOMONAS VAGINALIS AMPLIF: CPT | Performed by: ADVANCED PRACTICE MIDWIFE

## 2019-08-12 PROCEDURE — 87491 CHLMYD TRACH DNA AMP PROBE: CPT | Performed by: ADVANCED PRACTICE MIDWIFE

## 2019-08-12 PROCEDURE — 84112 EVAL AMNIOTIC FLUID PROTEIN: CPT | Performed by: ADVANCED PRACTICE MIDWIFE

## 2019-08-12 PROCEDURE — 87653 STREP B DNA AMP PROBE: CPT | Performed by: ADVANCED PRACTICE MIDWIFE

## 2019-08-12 PROCEDURE — 59025 FETAL NON-STRESS TEST: CPT

## 2019-08-12 PROCEDURE — 87086 URINE CULTURE/COLONY COUNT: CPT | Performed by: ADVANCED PRACTICE MIDWIFE

## 2019-08-12 PROCEDURE — 25000128 H RX IP 250 OP 636: Mod: ZF | Performed by: ADVANCED PRACTICE MIDWIFE

## 2019-08-12 PROCEDURE — 96372 THER/PROPH/DIAG INJ SC/IM: CPT | Mod: ZF

## 2019-08-12 PROCEDURE — 87591 N.GONORRHOEAE DNA AMP PROB: CPT | Performed by: ADVANCED PRACTICE MIDWIFE

## 2019-08-12 PROCEDURE — 87210 SMEAR WET MOUNT SALINE/INK: CPT | Performed by: ADVANCED PRACTICE MIDWIFE

## 2019-08-12 PROCEDURE — G0463 HOSPITAL OUTPT CLINIC VISIT: HCPCS | Mod: 25

## 2019-08-12 PROCEDURE — 81001 URINALYSIS AUTO W/SCOPE: CPT | Performed by: ADVANCED PRACTICE MIDWIFE

## 2019-08-12 PROCEDURE — 82731 ASSAY OF FETAL FIBRONECTIN: CPT | Performed by: ADVANCED PRACTICE MIDWIFE

## 2019-08-12 PROCEDURE — G0463 HOSPITAL OUTPT CLINIC VISIT: HCPCS | Mod: ZF

## 2019-08-12 RX ORDER — ONDANSETRON 2 MG/ML
4 INJECTION INTRAMUSCULAR; INTRAVENOUS EVERY 6 HOURS PRN
Status: DISCONTINUED | OUTPATIENT
Start: 2019-08-12 | End: 2019-08-12 | Stop reason: HOSPADM

## 2019-08-12 RX ORDER — METRONIDAZOLE 500 MG/1
500 TABLET ORAL 2 TIMES DAILY
Qty: 14 TABLET | Refills: 0 | Status: SHIPPED | OUTPATIENT
Start: 2019-08-12 | End: 2019-09-23

## 2019-08-12 RX ADMIN — HYDROXYPROGESTERONE CAPROATE 250 MG: 250 INJECTION INTRAMUSCULAR at 09:40

## 2019-08-12 ASSESSMENT — MIFFLIN-ST. JEOR
SCORE: 1442.9
SCORE: 1396.22

## 2019-08-12 ASSESSMENT — PAIN SCALES - GENERAL: PAINLEVEL: MILD PAIN (3)

## 2019-08-12 NOTE — NURSING NOTE
Clinic Administered Medication Documentation      Injectable Medication Documentation    Patient was given loi. Prior to medication administration, verified patients identity using patient s name and date of birth. Please see MAR and medication order for additional information. Patient instructed to report any adverse reaction to staff immediately .      Was entire vial of medication used? Yes  Vial/Syringe: Single dose vial  Expiration Date:  7-2020  Was this medication supplied by the patient? No

## 2019-08-12 NOTE — LETTER
"2019       RE: Evelyn Strauss  [address] [facility] [address]     Dear Colleague,    Thank you for referring your patient, Evelyn Strauss, to the [facility] at [facility]. Please see a copy of my visit note below.    Subjective:      34 year old  at 33w5d presentst for a routine prenatal appointment.    no vaginal bleeding or leakage of fluid.  some contractions. pos fetal movement, but less activity.       No HA, visual changes, RUQ or epigastric pain.   Patient concerns: c/o of lot of pressure, hard to walk, some contx -same way if felt the few days before last baby    Reviewed EOB labs with patient.  Reviewed TDAP Previously given  Objective:  Vitals:    19 0912   BP: 94/62   Pulse: 81   Weight: 74.4 kg (164 lb)   Height: 1.664 m (5' 5.51\")   , see ob flowsheet  Assessment/Plan     Encounter Diagnoses   Name Primary?     HRP (high risk pregnancy), unspecified trimester Yes      labor with  delivery, fetus 1, weekly 17P      No orders of the defined types were placed in this encounter.    No orders of the defined types were placed in this encounter.    ABO   Date Value Ref Range Status   2019 B  Final     RH(D)   Date Value Ref Range Status   2019 Pos  Final     Antibody Screen   Date Value Ref Range Status   2019 Neg  Final   Rhogam  was notgiven.    - Reviewed total weight gain, encouraged continued healthy diet and exercise.  .  Reviewed importance of daily fetal kick count and why/how to contact provider.    - Reviewed why/how to contact provider if headache/visual changes/RUQ or epigastric pain, decreased fetal movement, vaginal bleeding, leakage of fluid or more than 4 contractions in an hour.     Patient education/orders or handouts today:  PTL signs/symptoms  Reviewed GBS screening at 35-36 wks.    Head to hospital for  labor eval,-monitor, FFN, consider BMZ  Pt initially reluctant, " discussed concerns. agrees    Ruby Novak, APRN CNM

## 2019-08-12 NOTE — TELEPHONE ENCOUNTER
Evelyn called back to triage to discuss results. I informed her of +BV and needing to  and start Flagyl BID until gone.Also  informed her test(FFN) predicts PTL/ birth negative as well as she didn't have ROM. Informed her other tests were still pending.Pt indicated understanding and agreed with plan.

## 2019-08-12 NOTE — DISCHARGE INSTRUCTIONS
Discharge Instruction for Undelivered Patients      You were seen for: Labor Assessment, Membrane Assessment and Fetal Assessment  We Consulted: Arleen SAUCEDA CNM  You had (Test or Medicine):Fetal monitoring, several vaginal swabs, urine tests     Diet:   Drink 8 to 12 glasses of liquids (milk, juice, water) every day.  You may eat meals and snacks.     Activity:  Count fetal kicks everyday (see handout)  Call your doctor or nurse midwife if your baby is moving less than usual.     Call your provider if you notice:  Swelling in your face or increased swelling in your hands or legs.  Headaches that are not relieved by Tylenol (acetaminophen).  Changes in your vision (blurring: seeing spots or stars.)  Nausea (sick to your stomach) and vomiting (throwing up).   Weight gain of 5 pounds or more per week.  Heartburn that doesn't go away.  Signs of bladder infection: pain when you urinate (use the toilet), need to go more often and more urgently.  The bag of glynn (rupture of membranes) breaks, or you notice leaking in your underwear.  Bright red blood in your underwear.  Abdominal (lower belly) or stomach pain.  For first baby: Contractions (tightening) less than 5 minutes apart for one hour or more.  Second (plus) baby: Contractions (tightening) less than 10 minutes apart and getting stronger.  *If less than 34 weeks: Contractions (tightenings) more than 6 times in one hour.  Increase or change in vaginal discharge (note the color and amount)  Check your my chart later today.   Call clinic if you have any questions      Follow-up:  As scheduled in the clinic

## 2019-08-12 NOTE — PROGRESS NOTES
"Subjective:      34 year old  at 33w5d presentst for a routine prenatal appointment.    no vaginal bleeding or leakage of fluid.  some contractions. pos fetal movement, but less activity.       No HA, visual changes, RUQ or epigastric pain.   Patient concerns: c/o of lot of pressure, hard to walk, some contx -same way if felt the few days before last baby    Reviewed EOB labs with patient.  Reviewed TDAP Previously given  Objective:  Vitals:    19 0912   BP: 94/62   Pulse: 81   Weight: 74.4 kg (164 lb)   Height: 1.664 m (5' 5.51\")   , see ob flowsheet  Assessment/Plan     Encounter Diagnoses   Name Primary?     HRP (high risk pregnancy), unspecified trimester Yes      labor with  delivery, fetus 1, weekly 17P      No orders of the defined types were placed in this encounter.    No orders of the defined types were placed in this encounter.    ABO   Date Value Ref Range Status   2019 B  Final     RH(D)   Date Value Ref Range Status   2019 Pos  Final     Antibody Screen   Date Value Ref Range Status   2019 Neg  Final   , Rhogam  was notgiven.    - Reviewed total weight gain, encouraged continued healthy diet and exercise.  .  Reviewed importance of daily fetal kick count and why/how to contact provider.    - Reviewed why/how to contact provider if headache/visual changes/RUQ or epigastric pain, decreased fetal movement, vaginal bleeding, leakage of fluid or more than 4 contractions in an hour.     Patient education/orders or handouts today:  PTL signs/symptoms  Reviewed GBS screening at 35-36 wks.    Head to hospital for  labor eval,-monitor, FFN, consider BMZ  Pt initially reluctant, discussed concerns. agrees    Ruby Novak, APRN CNM      "

## 2019-08-12 NOTE — PROGRESS NOTES
Attempted to reach pt via phone regarding +BV and pending UC (indeterminant UA). Good news is -FFN and -ROM Plus.    Order for Flagyl placed to her pharmacy. Call to RN Triage to provide  when pt calls back.     Ibis Sam CNM

## 2019-08-12 NOTE — PLAN OF CARE
Data: [facility] at 0936.   Reason for maternal/fetal assessment per patient is  Labor (rule out PTL)  . Patient is a . Prenatal record reviewed.      OB History    Para Term  AB Living   2 1 0 1 0 1   SAB TAB Ectopic Multiple Live Births   0 0 0 0 1      # Outcome Date GA Lbr Devan/2nd Weight Sex Delivery Anes PTL Lv   2 Current            1  17 36w1d 04:15 / 02:40 2.977 kg (6 lb 9 oz) F Vag-Spont EPI, Local  JAYA      Name: Jerardo      Apgar1: 9  Apgar5: 9      Medical History:   Past Medical History:   Diagnosis Date     NO ACTIVE PROBLEMS      Uncomplicated asthma Childhood   . Gestational Age 33w5d. VSS. Cervix: not examined.  Fetal movement present. Patient reports decreased FM, pelvic pressure, more than usual vaginal discharge. Patient denies cramping, backache, UTI symptoms, GI problems, bloody show, vaginal bleeding, edema, headache, visual disturbances, epigastric or URQ pain, abdominal pain, rupture of membranes. Support persons are not present.  Action: Verbal consent for EFM. Triage assessment completed. EFM applied for NST. Uterine assessment toco shows 1 contraction every 10 minutes lasting 40 seconds. Patient reports not feeling contractions at this time. Fetal assessment: Presumed adequate fetal oxygenation documented (see flow record). Patient education pamphlets given on fetal movement counts and follow up instructions. Patient instructed to report change in fetal movement, vaginal leaking of fluid or bleeding, abdominal pain, or any concerns related to the pregnancy to her nurse/physician.   Response:  Arleen SAUCEDA CNM informed of patient arrival and chief complaints. Plan per provider is collect several specimens (see results review). Patient verbalized understanding of education and verbalized agreement with plan. Discharged ambulatory at 1105.    Ofelia Rojas RN

## 2019-08-12 NOTE — PROGRESS NOTES
HOSPITAL TRIAGE NOTE  ===================    CHIEF COMPLAINT  ========================  Evelyn Strauss is a 34 year old patient presenting today from a routine clinic visit at 33w5d for evaluation of decreased fetal movement and increased pelvic pressure in addition to contractions this Saturday. These resolved spontaneously by . She reports that she needs to leave here by 11 to go get her daughter (a friend is watching her).    Patient's last menstrual period was 2018.  Estimated Date of Delivery: Sep 25, 2019     HPI  ==================     Prenatal record and labs reviewed from [facility], through [facility] EMR.    CONTRACTIONS: irreg, mild and not felt by patient  ABDOMINAL PAIN: none  FETAL MOVEMENT: decreased since the weekend.    VAGINAL BLEEDING: none  RUPTURE OF MEMBRANES: negative ROM +  PELVIC PAIN: none    PREGNANCY COMPLICATIONS: History  labor previous pregnancy on Makeena, has 3 more doses.   OTHER: anemia    # Pain Assessment:  Current Pain Score 2019   Patient currently in pain? denies   Pain location -   Pain descriptors -   Evelyn s pain level was assessed and she currently denies pain.      REVIEW OF SYSTEMS  =====================  C: NEGATIVE for fever, chills  I: NEGATIVE for worrisome rashes, moles or lesions  E: NEGATIVE for vision changes or irritation  R: NEGATIVE for significant cough or SOB  CV: NEGATIVE for chest pain, palpitations or varicosities  GI: NEGATIVE for nausea, abdominal pain, heartburn, or change in bowel habits  : NEGATIVE for frequency, dysuria, or hematuria  M: NEGATIVE for significant arthralgias or myalgia  N: NEGATIVE for headache, weakness, dizziness or paresthesias  P: NEGATIVE for changes in mood or affect    PROBLEM LIST  ===============  Patient Active Problem List    Diagnosis Date Noted     Encounter for triage in pregnant patient 2019     Priority: Medium     Labor and delivery, indication for care  2019     Priority: Medium     Anemia, hbg 10.8 2019     Priority: Medium     2019: Reviewed hemoglobin of 10.8. Recommended iron supplementation. Patient declines PO iron supplementation at this time and prefers increasing iron rich foods in her diet. Agreeable to repeat CBC at 36 weeks and supplementation at this time if needed.       Abnormal glucose tolerance test, passed 3 hour glucose test 2019     Priority: Medium     19- , needs 3 hr GTT  7/15/19- passed 3 hour glucose test        labor with  delivery, fetus 1, weekly 17P 2019     Priority: Medium     19-US- cervical length wnl  19- MFM US- wnl- cervical length wnl    Weekly 17P until 36th week       HRP (high risk pregnancy), unspecified trimester 2019     Priority: Medium     PPROM at 36w1d with subsequent IOL  3/4/19- NIPT without first trimester screen- negative  19- agreeable to 17OHP and cervical length US. Ordered for MFM  Wants AFP- future order done___    19- peasized cyst left labia- no redness, slightly tender- use heat and observe.    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 stopped in preganncy     Drug use: No     Sexual activity: Yes     Partners: Male     Birth control/protection: None   Lifestyle     Physical activity:     Days per week: Not on file     Minutes per session: Not on file     Stress: Not on file   Relationships     Social connections:     Talks on phone: Not on file     Gets together: Not on file     Attends Religion service: Not on file     Active member of club or organization: Not on file     Attends meetings of clubs or organizations: Not on file     Relationship status: Not on file     Intimate partner violence:     Fear of current or ex partner: Not on file     Emotionally abused: Not on file     Physically abused: Not on file     Forced sexual activity: Not on file   Other Topics Concern      Service No     Blood Transfusions No     Caffeine Concern Yes     Comment: 1-2     Occupational Exposure No     Hobby Hazards No     Sleep Concern Yes     Comment: due to pregnancy     Stress Concern No     Weight Concern No     Special Diet No     Back Care No     Exercise Yes     Comment: 3-4     Bike Helmet Yes     Seat Belt Yes     Self-Exams No   Social History Narrative    How much exercise per week? Not much    How much calcium per day? 2-3 servings  Milk, green smoothie, yogurt        How much caffeine per day? None     How much vitamin D per day? In foods    Do you/your family wear seatbelts?  Yes    Do you/your family use safety helmets? Yes    Do you/your family use sunscreen? Yes    Do you/your family keep firearms in the home? No    Do you/your family have a smoke detector(s)? Yes        Do you feel safe in your home? Yes    Has anyone ever touched you in an unwanted manner? No            February 18, 2019 Rupesh Cedeno LPN     PARTNER: yes, not with her today.  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    FAMILY HISTORY  Family History   Problem Relation Age of Onset     Hypertension Mother      Depression Mother      Thyroid Disease Mother      Obesity Mother      Diabetes Maternal Grandmother      Alzheimer Disease Paternal Grandmother      Depression Sister      Obesity Sister      OB HISTORY  OB History    Para Term  AB Living   2 1 0 1 0 1   SAB TAB Ectopic Multiple Live Births   0 0 0 0 1      # Outcome Date GA Lbr Devan/2nd Weight Sex Delivery Anes PTL Lv   2 Current            1  17 36w1d 04:15 / 02:40 2.977 kg (6 lb 9 oz) F Vag-Spont EPI, Local  JAYA      Name: Jerardo      Apgar1: 9  Apgar5: 9     Prenatal Labs:   Lab Results   Component Value Date    ABO B 2019    RH Pos 2019    AS Neg 2019    HEPBANG Nonreactive 2019    TREPAB Negative 2017    RUQIGG 34 2019    HGB 10.8 (L) 2019     Rubella- immune    ULTRASOUND(s) reviewed: yes    US from 19  1) Intrauterine pregnancy at 23 weeks 2 days gestational age.  2) The amniotic fluid measurement is within normal limits.  3) On transvaginal imaging the cervix is normal in length and closed. There is no evidence of funneling or change with Valsalva.     EXAM  ============  /66 (BP Location: Right arm)   Pulse 88   Temp 97.6  F (36.4  C) (Oral)   Resp 18   Ht 1.575 m (5' 2\")   Wt 75.3 kg (166 lb)   LMP 2018   SpO2 98%   BMI 30.36 kg/m    GENERAL APPEARANCE: healthy, alert and no distress  RESP: lungs clear to auscultation - no rales, rhonchi or wheezes  BREAST: normal without masses, tenderness or nipple discharge and no palpable axillary masses or adenopathy  CV: regular rates and rhythm, normal S1 S2, no S3 or S4 and no murmur,and no varicosities  ABDOMEN:  soft, nontender, no epigastric pain  SKIN: no suspicious lesions or rashes  NEURO: Denies headache, blurred vision, other vision changes  PSYCH: mentation appears normal. and affect " normal/bright  MS/ LEGS: Reflexes normal bilaterally and No edema    CONTRACTIONS: irreg, mild, uterine irritability and not felt by patient   FETAL HEART TONES: continuous EFM- baseline 130 with moderate variability and positive accelerations. No decelerations.  NST: REACTIVE  EFW: 5.5    PELVIC EXAM: visualized via speculum exam. Closed/long/high   PAVON SCORE: NA  PRESENTATION: VERTEX  BLOOD: no  DISCHARGE: large amounts of white fluid    ROMPLUS: negative    LABS: UA, UC, Wet prep, fFN, GBS and GC/ Chlamydia  Lab results reviewed- +wet prep for BV. UA indeterminate - add-on for UC. fFN-, ROM+ was negative, GC/Chlam pending.      DIAGNOSIS  ============  33w5d seen on the [facility] Triage  for labor evaluation-  labor.   NST: REACTIVE  Fetal Heart rate tracing:category one    PLAN  ============  -Discharge to home with PTL instructions per discharge instruction form.    -Call or return to the [facility] with contractions, cramping, abdominal or pelvic pain, vaginal bleeding, leaking fluid or decreased fetal movement.    -Follow-up by phone regarding labs as pt needed to leave to get her daughter. Attempted call regarding lab results (see TC encounter note) pending UC and need to treat for BV (as well as other normal results) - yields voicemail. Left message with pt to call our triage RNs for .     -Offered betamethasone for fetal lung maturity, pt prefers to wait until lab results are back. She agrees to come back in for betamethasone if she has a +fFN.     -Follow up at your next clinic visit- next week for LISA and Kyree injection.     Ibis Sam CNM

## 2019-08-12 NOTE — PROVIDER NOTIFICATION
19 0953   Provider Notification   Provider Name/Title Arleen SAUCEDA CNM   Method of Notification Electronic Page   Request Evaluate - Remote;Evaluate in Person   Notification Reason Patient Arrived;Status Update   33.5 wks r/o PTL, decreased FM reported, pelvic pressure, hx of  delivery

## 2019-08-13 LAB
C TRACH DNA SPEC QL NAA+PROBE: NEGATIVE
GP B STREP DNA SPEC QL NAA+PROBE: NEGATIVE
N GONORRHOEA DNA SPEC QL NAA+PROBE: NEGATIVE
SPECIMEN SOURCE: NORMAL

## 2019-08-14 LAB
BACTERIA SPEC CULT: NO GROWTH
SPECIMEN SOURCE: NORMAL

## 2019-08-19 ENCOUNTER — ALLIED HEALTH/NURSE VISIT (OUTPATIENT)
Dept: OBGYN | Facility: CLINIC | Age: 34
End: 2019-08-19
Payer: COMMERCIAL

## 2019-08-19 PROCEDURE — 96372 THER/PROPH/DIAG INJ SC/IM: CPT | Mod: ZF

## 2019-08-19 PROCEDURE — 25000128 H RX IP 250 OP 636: Mod: ZF | Performed by: ADVANCED PRACTICE MIDWIFE

## 2019-08-19 PROCEDURE — 40000269 ZZH STATISTIC NO CHARGE FACILITY FEE: Mod: ZF

## 2019-08-19 RX ADMIN — HYDROXYPROGESTERONE CAPROATE 250 MG: 250 INJECTION INTRAMUSCULAR at 08:50

## 2019-08-19 NOTE — NURSING NOTE
Chief Complaint   Patient presents with     Allied Health Visit     [place] injection.        See LIZZ Aguilar 8/19/2019    Clinic Administered Medication Documentation      Injectable Medication Documentation    Patient was given Yessy 250mg/mL. Prior to medication administration, verified patients identity using patient s name and date of birth. Please see MAR and medication order for additional information. Patient instructed to report any adverse reaction to staff immediately .      Was entire vial of medication used? Yes  Vial/Syringe: Single dose vial  Expiration Date:  07/2020  Was this medication supplied by the patient? No

## 2019-08-26 ENCOUNTER — OFFICE VISIT (OUTPATIENT)
Dept: OBGYN | Facility: CLINIC | Age: 34
End: 2019-08-26
Attending: ADVANCED PRACTICE MIDWIFE
Payer: COMMERCIAL

## 2019-08-26 VITALS
SYSTOLIC BLOOD PRESSURE: 102 MMHG | WEIGHT: 165 LBS | HEART RATE: 72 BPM | DIASTOLIC BLOOD PRESSURE: 68 MMHG | BODY MASS INDEX: 30.18 KG/M2

## 2019-08-26 DIAGNOSIS — O09.90 HRP (HIGH RISK PREGNANCY), UNSPECIFIED TRIMESTER: Chronic | ICD-10-CM

## 2019-08-26 PROBLEM — Z36.89 ENCOUNTER FOR TRIAGE IN PREGNANT PATIENT: Status: RESOLVED | Noted: 2019-08-12 | Resolved: 2019-08-26

## 2019-08-26 PROCEDURE — G0463 HOSPITAL OUTPT CLINIC VISIT: HCPCS | Mod: ZF

## 2019-08-26 PROCEDURE — 96372 THER/PROPH/DIAG INJ SC/IM: CPT | Mod: ZF

## 2019-08-26 PROCEDURE — 25000128 H RX IP 250 OP 636: Mod: ZF | Performed by: ADVANCED PRACTICE MIDWIFE

## 2019-08-26 RX ADMIN — HYDROXYPROGESTERONE CAPROATE 250 MG: 250 INJECTION INTRAMUSCULAR at 08:40

## 2019-08-26 ASSESSMENT — PAIN SCALES - GENERAL: PAINLEVEL: NO PAIN (0)

## 2019-08-26 NOTE — LETTER
2019    RE: Evelyn Strauss  [address] [facility] [address]     Dear Colleague,    Thank you for referring your patient, Evelyn Strauss, to the [facility] at [facility]. Please see a copy of my visit note below.    Subjective:      34 year old  at 35w5d presents for a routine prenatal appointment.    no vaginal bleeding,  leakage of fluid, or change in vaginal discharge.  occ  contractions.  Good  fetal movement.       No HA, visual changes, RUQ or epigastric pain.   Patient concerns: none today   Feeling well overall.     Objective:  Vitals:    19 0818   BP: 102/68   Pulse: 72   Weight: 74.8 kg (165 lb)   See OB flowsheet    Assessment/Plan  PHQ-9 SCORE 3/20/2017 3/4/2019 2019   PHQ-9 Total Score 1 3 2     [unfilled]  GBS screening:NEG   Labor signs discussed. Reinforced daily fetal movement counts.  Reviewed why/how to contact provider if headache/visual changes/RUQ or epigastric pain, decreased fetal movement, vaginal bleeding, leakage of fluid.   Return to clinic in 1 week and prn if questions or concerns.   17 OHP done today     SOHAIL Bai CNM

## 2019-08-26 NOTE — NURSING NOTE
Clinic Administered Medication Documentation      Injectable Medication Documentation    Patient was given loi. Prior to medication administration, verified patients identity using patient s name and date of birth. Please see MAR and medication order for additional information. Patient instructed to report any adverse reaction to staff immediately .      Was entire vial of medication used? Yes  Vial/Syringe: Single dose vial  Expiration Date:  875042  Was this medication supplied by the patient? No

## 2019-09-03 ENCOUNTER — ALLIED HEALTH/NURSE VISIT (OUTPATIENT)
Dept: OBGYN | Facility: CLINIC | Age: 34
End: 2019-09-03
Attending: ADVANCED PRACTICE MIDWIFE
Payer: COMMERCIAL

## 2019-09-03 DIAGNOSIS — O09.90 HRP (HIGH RISK PREGNANCY), UNSPECIFIED TRIMESTER: Primary | ICD-10-CM

## 2019-09-03 PROCEDURE — 25000128 H RX IP 250 OP 636: Mod: ZF | Performed by: ADVANCED PRACTICE MIDWIFE

## 2019-09-03 PROCEDURE — 40000269 ZZH STATISTIC NO CHARGE FACILITY FEE: Mod: ZF

## 2019-09-03 PROCEDURE — 96372 THER/PROPH/DIAG INJ SC/IM: CPT | Mod: ZF

## 2019-09-03 RX ADMIN — HYDROXYPROGESTERONE CAPROATE 250 MG: 250 INJECTION INTRAMUSCULAR at 08:14

## 2019-09-03 NOTE — NURSING NOTE
Chief Complaint   Patient presents with     Allied Health Visit     [place] injection.       See LIZZ Aguilar 9/3/2019    Clinic Administered Medication Documentation    MEDICATION LIST:   Injectable Medication Documentation    Patient was given [place] 250mg/mL. Prior to medication administration, verified patients identity using patient s name and date of birth. Please see MAR and medication order for additional information. Patient instructed to report any adverse reaction to staff immediately .      Was entire vial of medication used? Yes  Vial/Syringe: Single dose vial  Expiration Date:  07/2020  Was this medication supplied by the patient? No

## 2019-09-09 ENCOUNTER — ANESTHESIA (OUTPATIENT)
Dept: OBGYN | Facility: CLINIC | Age: 34
End: 2019-09-09
Payer: COMMERCIAL

## 2019-09-09 ENCOUNTER — ANESTHESIA EVENT (OUTPATIENT)
Dept: OBGYN | Facility: CLINIC | Age: 34
End: 2019-09-09
Payer: COMMERCIAL

## 2019-09-09 ENCOUNTER — HOSPITAL ENCOUNTER (INPATIENT)
Facility: CLINIC | Age: 34
LOS: 2 days | Discharge: HOME HEALTH | End: 2019-09-11
Attending: ADVANCED PRACTICE MIDWIFE | Admitting: ADVANCED PRACTICE MIDWIFE
Payer: COMMERCIAL

## 2019-09-09 PROBLEM — Z36.89 ENCOUNTER FOR TRIAGE IN PREGNANT PATIENT: Status: ACTIVE | Noted: 2019-09-09

## 2019-09-09 LAB
ABO + RH BLD: NORMAL
ABO + RH BLD: NORMAL
BASOPHILS # BLD AUTO: 0 10E9/L (ref 0–0.2)
BASOPHILS NFR BLD AUTO: 0.2 %
BLD GP AB SCN SERPL QL: NORMAL
BLOOD BANK CMNT PATIENT-IMP: NORMAL
DIFFERENTIAL METHOD BLD: ABNORMAL
EOSINOPHIL # BLD AUTO: 0.3 10E9/L (ref 0–0.7)
EOSINOPHIL NFR BLD AUTO: 2.3 %
ERYTHROCYTE [DISTWIDTH] IN BLOOD BY AUTOMATED COUNT: 13 % (ref 10–15)
HCT VFR BLD AUTO: 38.8 % (ref 35–47)
HGB BLD-MCNC: 13.6 G/DL (ref 11.7–15.7)
IMM GRANULOCYTES # BLD: 0.1 10E9/L (ref 0–0.4)
IMM GRANULOCYTES NFR BLD: 0.4 %
LYMPHOCYTES # BLD AUTO: 1.4 10E9/L (ref 0.8–5.3)
LYMPHOCYTES NFR BLD AUTO: 11.7 %
MCH RBC QN AUTO: 31.6 PG (ref 26.5–33)
MCHC RBC AUTO-ENTMCNC: 35.1 G/DL (ref 31.5–36.5)
MCV RBC AUTO: 90 FL (ref 78–100)
MONOCYTES # BLD AUTO: 0.8 10E9/L (ref 0–1.3)
MONOCYTES NFR BLD AUTO: 6.7 %
NEUTROPHILS # BLD AUTO: 9.4 10E9/L (ref 1.6–8.3)
NEUTROPHILS NFR BLD AUTO: 78.7 %
NRBC # BLD AUTO: 0 10*3/UL
NRBC BLD AUTO-RTO: 0 /100
PLATELET # BLD AUTO: 199 10E9/L (ref 150–450)
RBC # BLD AUTO: 4.31 10E12/L (ref 3.8–5.2)
SPECIMEN EXP DATE BLD: NORMAL
WBC # BLD AUTO: 11.9 10E9/L (ref 4–11)

## 2019-09-09 PROCEDURE — 25800030 ZZH RX IP 258 OP 636: Performed by: ADVANCED PRACTICE MIDWIFE

## 2019-09-09 PROCEDURE — 0KQM0ZZ REPAIR PERINEUM MUSCLE, OPEN APPROACH: ICD-10-PCS | Performed by: ADVANCED PRACTICE MIDWIFE

## 2019-09-09 PROCEDURE — 12000001 ZZH R&B MED SURG/OB UMMC

## 2019-09-09 PROCEDURE — G0463 HOSPITAL OUTPT CLINIC VISIT: HCPCS

## 2019-09-09 PROCEDURE — 25000125 ZZHC RX 250: Performed by: ADVANCED PRACTICE MIDWIFE

## 2019-09-09 PROCEDURE — 25000128 H RX IP 250 OP 636: Performed by: STUDENT IN AN ORGANIZED HEALTH CARE EDUCATION/TRAINING PROGRAM

## 2019-09-09 PROCEDURE — 3E0R3BZ INTRODUCTION OF ANESTHETIC AGENT INTO SPINAL CANAL, PERCUTANEOUS APPROACH: ICD-10-PCS | Performed by: ANESTHESIOLOGY

## 2019-09-09 PROCEDURE — 25000125 ZZHC RX 250: Performed by: ANESTHESIOLOGY

## 2019-09-09 PROCEDURE — 72200001 ZZH LABOR CARE VAGINAL DELIVERY SINGLE

## 2019-09-09 PROCEDURE — 86900 BLOOD TYPING SEROLOGIC ABO: CPT | Performed by: ADVANCED PRACTICE MIDWIFE

## 2019-09-09 PROCEDURE — 85025 COMPLETE CBC W/AUTO DIFF WBC: CPT | Performed by: ADVANCED PRACTICE MIDWIFE

## 2019-09-09 PROCEDURE — 86850 RBC ANTIBODY SCREEN: CPT | Performed by: ADVANCED PRACTICE MIDWIFE

## 2019-09-09 PROCEDURE — 86901 BLOOD TYPING SEROLOGIC RH(D): CPT | Performed by: ADVANCED PRACTICE MIDWIFE

## 2019-09-09 PROCEDURE — 86780 TREPONEMA PALLIDUM: CPT | Performed by: ADVANCED PRACTICE MIDWIFE

## 2019-09-09 PROCEDURE — 25000132 ZZH RX MED GY IP 250 OP 250 PS 637: Performed by: ADVANCED PRACTICE MIDWIFE

## 2019-09-09 PROCEDURE — 00HU33Z INSERTION OF INFUSION DEVICE INTO SPINAL CANAL, PERCUTANEOUS APPROACH: ICD-10-PCS | Performed by: ANESTHESIOLOGY

## 2019-09-09 RX ORDER — OXYTOCIN/0.9 % SODIUM CHLORIDE 30/500 ML
100 PLASTIC BAG, INJECTION (ML) INTRAVENOUS CONTINUOUS
Status: DISCONTINUED | OUTPATIENT
Start: 2019-09-09 | End: 2019-09-11 | Stop reason: HOSPADM

## 2019-09-09 RX ORDER — OXYTOCIN 10 [USP'U]/ML
10 INJECTION, SOLUTION INTRAMUSCULAR; INTRAVENOUS
Status: DISCONTINUED | OUTPATIENT
Start: 2019-09-09 | End: 2019-09-09

## 2019-09-09 RX ORDER — CHOLECALCIFEROL (VITAMIN D3) 50 MCG
1 TABLET ORAL DAILY
COMMUNITY
End: 2019-09-23

## 2019-09-09 RX ORDER — OXYTOCIN/0.9 % SODIUM CHLORIDE 30/500 ML
PLASTIC BAG, INJECTION (ML) INTRAVENOUS
Status: DISCONTINUED
Start: 2019-09-09 | End: 2019-09-10 | Stop reason: HOSPADM

## 2019-09-09 RX ORDER — FENTANYL CITRATE 50 UG/ML
50-100 INJECTION, SOLUTION INTRAMUSCULAR; INTRAVENOUS
Status: DISCONTINUED | OUTPATIENT
Start: 2019-09-09 | End: 2019-09-09

## 2019-09-09 RX ORDER — ONDANSETRON 2 MG/ML
4 INJECTION INTRAMUSCULAR; INTRAVENOUS EVERY 6 HOURS PRN
Status: DISCONTINUED | OUTPATIENT
Start: 2019-09-09 | End: 2019-09-09

## 2019-09-09 RX ORDER — IBUPROFEN 800 MG/1
800 TABLET, FILM COATED ORAL EVERY 6 HOURS PRN
Status: DISCONTINUED | OUTPATIENT
Start: 2019-09-09 | End: 2019-09-11 | Stop reason: HOSPADM

## 2019-09-09 RX ORDER — METHYLERGONOVINE MALEATE 0.2 MG/ML
200 INJECTION INTRAVENOUS
Status: DISCONTINUED | OUTPATIENT
Start: 2019-09-09 | End: 2019-09-09

## 2019-09-09 RX ORDER — ACETAMINOPHEN 325 MG/1
650 TABLET ORAL EVERY 4 HOURS PRN
Status: DISCONTINUED | OUTPATIENT
Start: 2019-09-09 | End: 2019-09-09

## 2019-09-09 RX ORDER — LIDOCAINE 40 MG/G
CREAM TOPICAL
Status: DISCONTINUED | OUTPATIENT
Start: 2019-09-09 | End: 2019-09-09

## 2019-09-09 RX ORDER — SODIUM CHLORIDE, SODIUM LACTATE, POTASSIUM CHLORIDE, CALCIUM CHLORIDE 600; 310; 30; 20 MG/100ML; MG/100ML; MG/100ML; MG/100ML
INJECTION, SOLUTION INTRAVENOUS CONTINUOUS
Status: DISCONTINUED | OUTPATIENT
Start: 2019-09-09 | End: 2019-09-09

## 2019-09-09 RX ORDER — NALOXONE HYDROCHLORIDE 0.4 MG/ML
.1-.4 INJECTION, SOLUTION INTRAMUSCULAR; INTRAVENOUS; SUBCUTANEOUS
Status: DISCONTINUED | OUTPATIENT
Start: 2019-09-09 | End: 2019-09-09

## 2019-09-09 RX ORDER — HYDROCORTISONE 2.5 %
CREAM (GRAM) TOPICAL 3 TIMES DAILY PRN
Status: DISCONTINUED | OUTPATIENT
Start: 2019-09-09 | End: 2019-09-11 | Stop reason: HOSPADM

## 2019-09-09 RX ORDER — OXYTOCIN/0.9 % SODIUM CHLORIDE 30/500 ML
340 PLASTIC BAG, INJECTION (ML) INTRAVENOUS CONTINUOUS PRN
Status: DISCONTINUED | OUTPATIENT
Start: 2019-09-09 | End: 2019-09-11 | Stop reason: HOSPADM

## 2019-09-09 RX ORDER — OXYTOCIN 10 [USP'U]/ML
10 INJECTION, SOLUTION INTRAMUSCULAR; INTRAVENOUS
Status: DISCONTINUED | OUTPATIENT
Start: 2019-09-09 | End: 2019-09-11 | Stop reason: HOSPADM

## 2019-09-09 RX ORDER — AMOXICILLIN 250 MG
1 CAPSULE ORAL 2 TIMES DAILY
Status: DISCONTINUED | OUTPATIENT
Start: 2019-09-09 | End: 2019-09-11 | Stop reason: HOSPADM

## 2019-09-09 RX ORDER — CARBOPROST TROMETHAMINE 250 UG/ML
250 INJECTION, SOLUTION INTRAMUSCULAR
Status: DISCONTINUED | OUTPATIENT
Start: 2019-09-09 | End: 2019-09-09

## 2019-09-09 RX ORDER — LIDOCAINE HYDROCHLORIDE 10 MG/ML
INJECTION, SOLUTION EPIDURAL; INFILTRATION; INTRACAUDAL; PERINEURAL
Status: DISCONTINUED
Start: 2019-09-09 | End: 2019-09-10 | Stop reason: HOSPADM

## 2019-09-09 RX ORDER — MISOPROSTOL 200 UG/1
TABLET ORAL
Status: DISCONTINUED
Start: 2019-09-09 | End: 2019-09-10 | Stop reason: HOSPADM

## 2019-09-09 RX ORDER — OXYCODONE AND ACETAMINOPHEN 5; 325 MG/1; MG/1
1 TABLET ORAL
Status: DISCONTINUED | OUTPATIENT
Start: 2019-09-09 | End: 2019-09-09

## 2019-09-09 RX ORDER — ACETAMINOPHEN 325 MG/1
650 TABLET ORAL EVERY 4 HOURS PRN
Status: DISCONTINUED | OUTPATIENT
Start: 2019-09-09 | End: 2019-09-11 | Stop reason: HOSPADM

## 2019-09-09 RX ORDER — OXYTOCIN/0.9 % SODIUM CHLORIDE 30/500 ML
100-340 PLASTIC BAG, INJECTION (ML) INTRAVENOUS CONTINUOUS PRN
Status: COMPLETED | OUTPATIENT
Start: 2019-09-09 | End: 2019-09-09

## 2019-09-09 RX ORDER — NALBUPHINE HYDROCHLORIDE 10 MG/ML
2.5-5 INJECTION, SOLUTION INTRAMUSCULAR; INTRAVENOUS; SUBCUTANEOUS EVERY 6 HOURS PRN
Status: DISCONTINUED | OUTPATIENT
Start: 2019-09-09 | End: 2019-09-10

## 2019-09-09 RX ORDER — OXYTOCIN 10 [USP'U]/ML
INJECTION, SOLUTION INTRAMUSCULAR; INTRAVENOUS
Status: DISCONTINUED
Start: 2019-09-09 | End: 2019-09-09 | Stop reason: WASHOUT

## 2019-09-09 RX ORDER — LANOLIN 100 %
OINTMENT (GRAM) TOPICAL
Status: DISCONTINUED | OUTPATIENT
Start: 2019-09-09 | End: 2019-09-11 | Stop reason: HOSPADM

## 2019-09-09 RX ORDER — NALBUPHINE HYDROCHLORIDE 10 MG/ML
2.5-5 INJECTION, SOLUTION INTRAMUSCULAR; INTRAVENOUS; SUBCUTANEOUS EVERY 6 HOURS PRN
Status: DISCONTINUED | OUTPATIENT
Start: 2019-09-09 | End: 2019-09-11 | Stop reason: HOSPADM

## 2019-09-09 RX ORDER — NALOXONE HYDROCHLORIDE 0.4 MG/ML
.1-.4 INJECTION, SOLUTION INTRAMUSCULAR; INTRAVENOUS; SUBCUTANEOUS
Status: DISCONTINUED | OUTPATIENT
Start: 2019-09-09 | End: 2019-09-11 | Stop reason: HOSPADM

## 2019-09-09 RX ORDER — LIDOCAINE HYDROCHLORIDE AND EPINEPHRINE 15; 5 MG/ML; UG/ML
INJECTION, SOLUTION EPIDURAL PRN
Status: DISCONTINUED | OUTPATIENT
Start: 2019-09-09 | End: 2019-09-16 | Stop reason: HOSPADM

## 2019-09-09 RX ORDER — BISACODYL 10 MG
10 SUPPOSITORY, RECTAL RECTAL DAILY PRN
Status: DISCONTINUED | OUTPATIENT
Start: 2019-09-11 | End: 2019-09-11 | Stop reason: HOSPADM

## 2019-09-09 RX ORDER — AMOXICILLIN 250 MG
2 CAPSULE ORAL 2 TIMES DAILY
Status: DISCONTINUED | OUTPATIENT
Start: 2019-09-09 | End: 2019-09-11 | Stop reason: HOSPADM

## 2019-09-09 RX ORDER — MISOPROSTOL 100 UG/1
25 TABLET ORAL
Status: DISCONTINUED | OUTPATIENT
Start: 2019-09-09 | End: 2019-09-09

## 2019-09-09 RX ORDER — IBUPROFEN 800 MG/1
800 TABLET, FILM COATED ORAL
Status: DISCONTINUED | OUTPATIENT
Start: 2019-09-09 | End: 2019-09-09

## 2019-09-09 RX ORDER — EPHEDRINE SULFATE 50 MG/ML
INJECTION, SOLUTION INTRAMUSCULAR; INTRAVENOUS; SUBCUTANEOUS
Status: DISCONTINUED
Start: 2019-09-09 | End: 2019-09-09 | Stop reason: WASHOUT

## 2019-09-09 RX ADMIN — SODIUM CHLORIDE, POTASSIUM CHLORIDE, SODIUM LACTATE AND CALCIUM CHLORIDE 1000 ML: 600; 310; 30; 20 INJECTION, SOLUTION INTRAVENOUS at 17:28

## 2019-09-09 RX ADMIN — SODIUM CHLORIDE, POTASSIUM CHLORIDE, SODIUM LACTATE AND CALCIUM CHLORIDE: 600; 310; 30; 20 INJECTION, SOLUTION INTRAVENOUS at 20:09

## 2019-09-09 RX ADMIN — Medication 25 MCG: at 11:49

## 2019-09-09 RX ADMIN — Medication 340 ML/HR: at 21:04

## 2019-09-09 RX ADMIN — Medication 6 ML: at 18:32

## 2019-09-09 RX ADMIN — IBUPROFEN 800 MG: 800 TABLET, FILM COATED ORAL at 22:13

## 2019-09-09 RX ADMIN — LIDOCAINE HYDROCHLORIDE,EPINEPHRINE BITARTRATE 3 ML: 15; .005 INJECTION, SOLUTION EPIDURAL; INFILTRATION; INTRACAUDAL; PERINEURAL at 18:27

## 2019-09-09 RX ADMIN — Medication 25 MCG: at 14:12

## 2019-09-09 RX ADMIN — Medication 25 MCG: at 16:58

## 2019-09-09 RX ADMIN — Medication: at 18:37

## 2019-09-09 ASSESSMENT — MIFFLIN-ST. JEOR: SCORE: 1369.01

## 2019-09-09 NOTE — ANESTHESIA PREPROCEDURE EVALUATION
Anesthesia Pre-Procedure Evaluation    Patient: Evelyn Strauss   MRN:     [id number] Gender:   female   Age:    34 year old :      1985        Preoperative Diagnosis: * No surgery found *        Past Medical History:   Diagnosis Date     NO ACTIVE PROBLEMS      Uncomplicated asthma Childhood      Past Surgical History:   Procedure Laterality Date     C SUTURE DENTAL EXTRACTION SITE            Anesthesia Evaluation       history and physical reviewed . Pt has had prior anesthetic. Type: Regional    No history of anesthetic complications          ROS/MED HX    ENT/Pulmonary:     (+)vocal cord abnormalities- Intermittent asthma , . .    Neurologic:  - neg neurologic ROS     Cardiovascular:  - neg cardiovascular ROS       METS/Exercise Tolerance:  >4 METS   Hematologic:  - neg hematologic  ROS       Musculoskeletal:  - neg musculoskeletal ROS       GI/Hepatic:  - neg GI/hepatic ROS       Renal/Genitourinary:  - ROS Renal section negative       Endo:  - neg endo ROS       Psychiatric:  - neg psychiatric ROS       Infectious Disease:  - neg infectious disease ROS       Malignancy:      - no malignancy   Other:    (+) Possibly pregnant C-spine cleared: N/A, no H/O Chronic Pain,no other significant disability                    JZG FV AN PHYSICAL EXAM    LABS:  CBC:   Lab Results   Component Value Date    WBC 11.9 (H) 2019    WBC 9.8 2019    HGB 13.6 2019    HGB 10.8 (L) 2019    HCT 38.8 2019    HCT 32.7 (L) 2019     2019     2019     BMP: No results found for: NA, POTASSIUM, CHLORIDE, CO2, BUN, CR, GLC  COAGS: No results found for: PTT, INR, FIBR  POC: No results found for: BGM, HCG, HCGS  OTHER: No results found for: PH, LACT, A1C, BRUCE, PHOS, MAG, ALBUMIN, PROTTOTAL, ALT, AST, GGT, ALKPHOS, BILITOTAL, BILIDIRECT, LIPASE, AMYLASE, JUAN R, TSH, T4, T3, CRP, SED     Preop Vitals    BP Readings from Last 3 Encounters:   19 110/69   19 102/68  "  08/12/19 108/66    Pulse Readings from Last 3 Encounters:   08/26/19 72   08/12/19 88   08/12/19 81      Resp Readings from Last 3 Encounters:   09/09/19 16   08/12/19 18   01/25/17 14    SpO2 Readings from Last 3 Encounters:   08/12/19 98%   01/24/17 97%      Temp Readings from Last 1 Encounters:   09/09/19 36.8  C (98.3  F) (Oral)    Ht Readings from Last 1 Encounters:   09/09/19 1.575 m (5' 2\")      Wt Readings from Last 1 Encounters:   09/09/19 72.6 kg (160 lb)    Estimated body mass index is 29.26 kg/m  as calculated from the following:    Height as of this encounter: 1.575 m (5' 2\").    Weight as of this encounter: 72.6 kg (160 lb).     LDA:  Peripheral IV 09/09/19 Left;Dorsal Hand (Active)   Site Assessment WDL 9/9/2019  4:00 PM   Line Status Saline locked 9/9/2019  4:00 PM   Phlebitis Scale 0-->no symptoms 9/9/2019  4:00 PM   Number of days: 0        Assessment:   ASA SCORE: 2            PONV Management: Adult Risk Factors: Female                   Shola Robin MD  "

## 2019-09-09 NOTE — PLAN OF CARE
Data: Patient admitted to room 442 at 0735. Patient is a . Prenatal record reviewed.   OB History    Para Term  AB Living   2 1 0 1 0 1   SAB TAB Ectopic Multiple Live Births   0 0 0 0 1      # Outcome Date GA Lbr Devan/2nd Weight Sex Delivery Anes PTL Lv   2 Current            1  17 36w1d 04:15 / 02:40 2.977 kg (6 lb 9 oz) F Vag-Spont EPI, Local  JAYA      Birth Comments: PPROM       Name: Jerardo      Apgar1: 9  Apgar5: 9   .  Medical History:   Past Medical History:   Diagnosis Date    NO ACTIVE PROBLEMS     Uncomplicated asthma Childhood   .  Gestational age 37w5d. Vital signs per doc flowsheet. Fetal movement present. Patient reports Fluid Leak (since 0200)   as reason for admission. , Singh, is bringing other daughter to  and will be on his way.   Action: Report from patient, RN obtained at 0730. Care of patient assumed at 0735. Verbal consent for EFM, external fetal monitors applied. Admission assessment completed. Patient educated on labor process. Patient instructed to report change in fetal movement, contractions, vaginal leaking of fluid or bleeding, abdominal pain, or any concerns related to the pregnancy to her nurse/physician. Patient oriented to room, call light in reach. Updated of plan for provider to assess, given menu to order food.   Response: Faizan Quintero CNM informed of arrival. Plan per provider is to assess following rounds. Patient verbalized understanding of education and verbalized agreement with plan. Patient coping with labor via bedrest up ad alexa to bathroom until  arrives.

## 2019-09-09 NOTE — ANESTHESIA PROCEDURE NOTES
Epidural Procedure Note    Staff:     Anesthesiologist:  Kiley Mccarty MD    Resident/CRNA:  Domi Hernandez MD    Procedure performed by resident/CRNA in the presence of a teaching physician    Location: OB     Procedure start time:  9/9/2019 6:21 PM     Procedure end time:  9/9/2019 6:32 PM   Pre-procedure checklist:   patient identified, IV checked, site marked, risks and benefits discussed, informed consent, monitors and equipment checked, pre-op evaluation, at physician/surgeon's request and post-op pain management      Correct Patient: Yes      Correct Position: Yes      Correct Site: Yes      Correct Procedure: Yes      Correct Laterality:  Yes    Site Marked:  Yes  Procedure:     Procedure:  Epidural catheter    ASA:  2    Diagnosis:  Labor    Position:  Sitting    Sterile Prep: chloraprep, mask, sterile gloves and patient draped      Insertion site:  L3-4    Local skin infiltration:  1% lidocaine    amount (mL):  3    Approach:  Midline    Needle gauge (G):  17    Needle Length (in):  3.5    Block Needle Type:  Touhy    Injection Technique:  LORT saline    CHRISTELLE at (cm):  7    Attempts:  2    Redirects:  2    Catheter gauge (G):  19    Catheter threaded easily: Yes      Threaded to cm at skin:  12    Threaded in epidural space (cm):  5    Paresthesias:  No    Aspiration negative for Heme or CSF: Yes      Test dose (mL):  3     Local anesthetic:  Lidocaine 1.5% w/ 1:200,000 epinephrine    Test dose time:  18:27    Test dose negative for signs of intravascular, subdural or intrathecal injection: Yes

## 2019-09-09 NOTE — PROGRESS NOTES
"Po cytotec orders placed    Blood pressure 104/67, temperature 98.5  F (36.9  C), temperature source Oral, height 1.575 m (5' 2\"), weight 72.6 kg (160 lb), last menstrual period 2018, not currently breastfeeding.  Patient Vitals for the past 24 hrs:   BP Temp Temp src Height Weight   19 1000 -- 98.5  F (36.9  C) Oral -- --   19 0757 104/67 97.5  F (36.4  C) Oral -- --   19 0756 -- -- Oral 1.575 m (5' 2\") 72.6 kg (160 lb)     General appearance: uncomfortable with contractions, resting in between  CONTRACTIONS: every 7-9 minutes  Pitocin- none,  Antibiotics- none  FETAL HEART TONES: continuous EFM- baseline 130 with moderate variability and positive accelerations. Late deceleration x 1.   ROM: clear fluid  PELVIC EXAM:deferred- Guerrero on admit= 3    # Pain Assessment:  Current Pain Score 2019   Patient currently in pain? yes   Pain location -   Pain descriptors -   - Evelyn is experiencing pain due to contractions. Pain management was discussed and the plan was created in a collaborative fashion.  Evelyn's response to the current recommendations: engaged  - not interested at this time, resting in between. Epidural per pt request.     ASSESSMENT:  ==============  IUP @ 37w5d, SROM clear fluid   Fetal Heart Rate Tracing category two d/t late decel x 1; category one since that time  GBS- negative    PO miso #1 given @ 1149    SROM  @ 0200, clear fluid, afebrile    Patient Active Problem List   Diagnosis     HRP (high risk pregnancy), unspecified trimester      labor with  delivery, fetus 1, weekly 17P     Abnormal glucose tolerance test, passed 3 hour glucose test     Anemia, hbg 10.8     Encounter for triage in pregnant patient     Labor and delivery indication for care or intervention     PLAN:  ===========  Reviewed guerrero score and contractions spacing out. Recommended cervical ripening with PO misoprostol.  Pt and  state miso is what she had last labor. Desires this " plan of care.  1st dose PO given @ 1149. Redose [id number] as appropriate.  Continue to monitor fluid color, maternal temperature, fhr and s/s triple I.  Pt resting at this time. Will request epidural when desires.  Reevaluate prn per maternal/fetal indications.    SOHAIL Brooks CNM     Addendum @ 1415:  2nd dose of PO misoprostol @ 1412. Pt resting, uncomfortable when feels contractions.  at bedside for support.  Does not desire epidural at this time.    SOHAIL Brokos CNM

## 2019-09-09 NOTE — PLAN OF CARE
Data: Contraction frequency q 8 minutes, Strength moderate and Duration  seconds, palpate moderate. Fetal assessment category one.  Leaking large amounts of clear/some red tinged fluid per pt.  Support person Peter,  present.  Interventions: Continue uterine/fetal assessment q1hr. Vital Signs per order set. Comfort measures offered, pt will ask for epidural when she feels she needs it.  Medicated for cervical dilation.  Plan: Provide labor/coping assistance as needed by patient and support person.  Observe for and notify care provider of indications of progressing labor, need for pain medications, or signs of fetal/maternal compromise. Cares handed off to evening HERLINDA WOMACK

## 2019-09-09 NOTE — PROGRESS NOTES
"Blood pressure 110/69, temperature 98.3  F (36.8  C), temperature source Oral, resp. rate 16, height 1.575 m (5' 2\"), weight 72.6 kg (160 lb), last menstrual period 2018, not currently breastfeeding.  Patient Vitals for the past 24 hrs:   BP Temp Temp src Heart Rate Resp Height Weight   19 1620 110/69 98.3  F (36.8  C) Oral -- 16 -- --   19 1412 -- 98.1  F (36.7  C) Oral -- -- -- --   19 1248 -- 98  F (36.7  C) Oral -- -- -- --   19 1148 102/56 98.6  F (37  C) Oral 90 16 -- --   19 1000 -- 98.5  F (36.9  C) Oral -- -- -- --   19 0757 104/67 97.5  F (36.4  C) Oral -- -- -- --   19 0756 -- -- Oral -- -- 1.575 m (5' 2\") 72.6 kg (160 lb)     General appearance: ctx still spaced out but pt more uncomfortable with contractions; desires epidural  CONTRACTIONS: every 6-9 minutes  Pitocin- none,  Antibiotics- none  FETAL HEART TONES: continuous EFM- baseline 140 with moderate variability and positive accelerations. Occasional variable decelerations with contractions  ROM: clear fluid  PELVIC EXAM:deferred    # Pain Assessment:  Current Pain Score 2019   Patient currently in pain? yes   Pain location -   Pain descriptors -   - Evelyn is experiencing pain due to contractions. Pain management was discussed and the plan was created in a collaborative fashion.  Evelyn's response to the current recommendations: engaged  - requesting epidural    ASSESSMENT:  ==============  IUP @ 37w5d, SROM clear fluid   Fetal Heart Rate Tracing category two d/t occasional variable decelerations  GBS- negative    S/p PO miso x 3 (1149, 1412, 1658)    SROM  @ 0200, clear fluid, afebrile    Patient Active Problem List   Diagnosis     HRP (high risk pregnancy), unspecified trimester      labor with  delivery, fetus 1, weekly 17P     Abnormal glucose tolerance test, passed 3 hour glucose test     Anemia, hbg 10.8     Encounter for triage in pregnant patient     Labor and delivery " indication for care or intervention     PLAN:  ===========  3rd dose of PO misoprostol given.   Pt requesting epidural.  Anesthesia call and provider to provider report given. Anesthesia currently in c/s- eta 30 minutes.  Pt updated by RN. IVF bolus is infusing.  Reevaluate after pt comfortable with epidural/2 hours after last dose of PO miso.   Continue to monitor fluid color, maternal temperature, FHR and s/s of triple I.    SOHAIL Brooks CNM     Addendum @ 184:  While waiting for epidural, pt became a lot more uncomfortable. Started feeling more pressure. Hoping for epidural soon. Agreeable to SVE.   SVE 5-6/90/-1 at 1809. Anesthesia to bedside, supported pt through epidural placement.  Will reevaluate when comfortable or if pt feels increased pressure.    SOHAIL Brooks CNM     Addendum @ :  Pt crying, intense rectal pressure. Complete/100/+1 with urge to push at .  Prepare room for delivery. Begin pushing efforts.  Anticipate .    SOHAIL Brooks CNM

## 2019-09-09 NOTE — H&P
ADMIT NOTE  =================  37w5d    Evelyn Strauss is a 34 year old female with an Patient's last menstrual period was 2018. and Estimated Date of Delivery: Sep 25, 2019 [facility] 2019 at 8:00 AM with SROM.    HPI  ================  Pt presented to  with reports of gush of clear fluid at approx 0200. Started feeling contractions around 0430. Arrived to  at approximately 0730. Grossly ruptured. Pt reporting contractions initially q15 minutes, now [id number], mildly more uncomfortable.  Reports + fetal movement.     Contractions- mild, [id number]  Fetal movement- active  ROM- yes  Vaginal bleeding- none  GBS- negative  FOB- is involved,  Singh  Other labor support- sera rn    Weight gain- 165 - 128 lbs, Total weight gain- 32 lbs  Height- 62  BMI- 23.4  First prenatal visit at 8+5 weeks, Total visits- 8    OTHER:  - Hx of PPROM and PTB at 36+1. Received weekly 17P throughout this pregnancy.  - Failed 1 hour glucose, passed 3 hour    PROBLEM LIST  =================  Patient Active Problem List    Diagnosis Date Noted     Encounter for triage in pregnant patient 2019     Priority: Medium     Anemia, hbg 10.8 2019     Priority: Medium     2019: Reviewed hemoglobin of 10.8. Recommended iron supplementation. Patient declines PO iron supplementation at this time and prefers increasing iron rich foods in her diet. Agreeable to repeat CBC at 36 weeks and supplementation at this time if needed.       Abnormal glucose tolerance test, passed 3 hour glucose test 2019     Priority: Medium     19- , needs 3 hr GTT  7/15/19- passed 3 hour glucose test        labor with  delivery, fetus 1, weekly 17P 2019     Priority: Medium     19-US- cervical length wnl  19- MFM US- wnl- cervical length wnl    Weekly 17P until 36th week       HRP (high risk pregnancy), unspecified trimester 2019     Priority: Medium     PPROM at  36w1d with subsequent IOL  3/4/19- NIPT without first trimester screen- negative  19- agreeable to 17OHP and cervical length US. Ordered for MFM  ___  19- peasized cyst left labia- no redness, slightly tender- use heat and observe.  2019: EOB, tdap given         HISTORIES  ============  Allergies   Allergen Reactions     Ceclor [Cefaclor] Rash     Past Medical History:   Diagnosis Date     NO ACTIVE PROBLEMS      Uncomplicated asthma Childhood     Past Surgical History:   Procedure Laterality Date     C SUTURE DENTAL EXTRACTION SITE     .  Family History   Problem Relation Age of Onset     Hypertension Mother      Depression Mother      Thyroid Disease Mother      Obesity Mother      Diabetes Maternal Grandmother      Alzheimer Disease Paternal Grandmother      Depression Sister      Obesity Sister      Social History     Tobacco Use     Smoking status: Never Smoker     Smokeless tobacco: Never Used   Substance Use Topics     Alcohol use: No     Alcohol/week: 0.0 oz     Frequency: Never     Comment: 2 a month tryong to conceive soon. stopped in preganncy     OB History    Para Term  AB Living   2 1 0 1 0 1   SAB TAB Ectopic Multiple Live Births   0 0 0 0 1      # Outcome Date GA Lbr Devan/2nd Weight Sex Delivery Anes PTL Lv   2 Current            1  17 36w1d 04:15 / 02:40 2.977 kg (6 lb 9 oz) F Vag-Spont EPI, Local  JAYA      Birth Comments: PPROM       Name: Jerardo      Apgar1: 9  Apgar5: 9        LABS:   ===========  Prenatal Labs:  Rhogam not indicated   Lab Results   Component Value Date    ABO B 2019    RH Pos 2019    AS Neg 2019    HEPBANG Nonreactive 2019    TREPAB Negative 2017    RUQIGG 34 2019    HGB 10.8 (L) 2019     Rubella immune    Lab Results   Component Value Date    GBS Negative 2019     Other labs:  No results found for this or any previous visit (from the past 24 hour(s)).    ROS  =========  Pt denies significant  "respiratory, cardiovacular, GI, or muscular/skeletalcomplaints.    See RN data base ROS.     PHYSICAL EXAM:  ===============  /67 (BP Location: Left arm)   Temp 97.5  F (36.4  C)   Ht 1.575 m (5' 2\")   Wt 72.6 kg (160 lb)   LMP 12/19/2018   BMI 29.26 kg/m    General appearance: mildly uncomfortable with ctx  GENERAL APPEARANCE: healthy, alert and no distress  RESP: lungs clear to auscultation - no rales, rhonchi or wheezes  BREAST: normal without masses, tenderness or nipple discharge and no palpable axillary masses or adenopathy  CV: regular rates and rhythm, normal S1 S2, no S3 or S4 and no murmur,and no varicosities  ABDOMEN:  soft, nontender, no epigastric pain  SKIN: no suspicious lesions or rashes  NEURO: Denies headache, blurred vision, other vision changes  PSYCH: mentation appears normal. and affect normal/bright  Legs: Reflexes normal bilaterally     Abdomen: gravid, vertex fetus per Leopold's, non-tender between contractions.   Cephalic presentation confirmed by BSU  EFW-  7 lbs.   CONTRACTIONS: mild and every 7 minutes  FETAL HEART TONES: continuous EFM- baseline 130 with moderate variability and positive accelerations. No decelerations.  PELVIC EXAM: 1/ 30%/-4. High fetal station, very posterior cervix/med  PAVON SCORE: 3  BLOODY SHOW: no   ROM:yes, large, clear  FLUID: clear and watery  ROMPLUS: not done    # Pain Assessment:  Current Pain Score 9/9/2019   Patient currently in pain? yes   Pain location -   Pain descriptors -   - Evelyn is experiencing pain due to contractions, pressure. Pain management was discussed and the plan was created in a collaborative fashion.  Evelyn's response to the current recommendations: engaged  - Desires epidural but not at this time    ASSESSMENT:  ==============  IUP @ 37w5d admitted SROM @ 0200   NST REACTIVE  Fetal Heart Rate - category one  GBS- negative    SROM 9/9/19 @ 0200, clear fluid, afebrile    Patient Active Problem List   Diagnosis     HRP (high " risk pregnancy), unspecified trimester      labor with  delivery, fetus 1, weekly 17P     Abnormal glucose tolerance test, passed 3 hour glucose test     Anemia, hbg 10.8     Encounter for triage in pregnant patient       PLAN:  ===========  Admit - see IP orders.  Admission labs ordered- abo/rh t&s, cbc with plts, anti-trep.  IV saline lock placed.  Pain medication options reviewed. Pt is interested in an epidural eventually- will request when desires.  Ambulation, hydration, position changes, birthing ball and tub options to facilitate labor reviewed with pt . Request epidural prn.  Discussed active management vs expectant management. Pt desire expectant at this time. Reviewed if ctx space could consider PO misoprostol; also discussed IV pitocin. Pt would be open; would like to wait at this time.  Anticipate SOHAIL Parekh, SMITHA

## 2019-09-10 LAB
HGB BLD-MCNC: 12 G/DL (ref 11.7–15.7)
T PALLIDUM AB SER QL: NONREACTIVE

## 2019-09-10 PROCEDURE — 12000001 ZZH R&B MED SURG/OB UMMC

## 2019-09-10 PROCEDURE — 36415 COLL VENOUS BLD VENIPUNCTURE: CPT | Performed by: ADVANCED PRACTICE MIDWIFE

## 2019-09-10 PROCEDURE — 85018 HEMOGLOBIN: CPT | Performed by: ADVANCED PRACTICE MIDWIFE

## 2019-09-10 PROCEDURE — 25000132 ZZH RX MED GY IP 250 OP 250 PS 637: Performed by: ADVANCED PRACTICE MIDWIFE

## 2019-09-10 RX ADMIN — IBUPROFEN 800 MG: 800 TABLET, FILM COATED ORAL at 07:09

## 2019-09-10 RX ADMIN — SENNOSIDES AND DOCUSATE SODIUM 1 TABLET: 8.6; 5 TABLET ORAL at 19:37

## 2019-09-10 RX ADMIN — IBUPROFEN 800 MG: 800 TABLET, FILM COATED ORAL at 13:24

## 2019-09-10 RX ADMIN — ACETAMINOPHEN 650 MG: 325 TABLET, FILM COATED ORAL at 12:13

## 2019-09-10 RX ADMIN — SENNOSIDES AND DOCUSATE SODIUM 1 TABLET: 8.6; 5 TABLET ORAL at 07:58

## 2019-09-10 RX ADMIN — IBUPROFEN 800 MG: 800 TABLET, FILM COATED ORAL at 19:37

## 2019-09-10 RX ADMIN — ACETAMINOPHEN 650 MG: 325 TABLET, FILM COATED ORAL at 07:09

## 2019-09-10 RX ADMIN — ACETAMINOPHEN 650 MG: 325 TABLET, FILM COATED ORAL at 20:30

## 2019-09-10 RX ADMIN — ACETAMINOPHEN 650 MG: 325 TABLET, FILM COATED ORAL at 16:14

## 2019-09-10 NOTE — PLAN OF CARE
baby girl at . Baby skin-to-skin and breastfeeding,  exam WDL. Pt stable during postpartum checks. VSS, uterus firm/midline/at umbilicus, lochia rubra/scant. Anticipate transition to NFCC shortly.

## 2019-09-10 NOTE — PLAN OF CARE
3rd dose of oral miso given at 1700 and within 15 minutes the contractions started ramping up in intensity and frequency. She asked for an epidural ay 1745 and IV bolus started. Notified CNM who called [facility].   Was comfortable by 1900 post epidural.   Vitals WNL, afebrile  FHT: having occasional late decels or variables. Otherwise moderate variability with accels.   [place]: irregular, 2-3 minutes.     Epidural pump settings incorrect in the MAR. Pump programmed and started by [facility] but order is incorrect in the MAR. Jannet [facility] resident notified.     Epirdual Settings are currently at:  Cont 10ml/hr  PCEA 5ml  Lockout 15 min  Max dose: 25ml/hour

## 2019-09-10 NOTE — L&D DELIVERY NOTE
Delivery Summary    Evelyn Strauss MRN# [id number]   Age: 34 year old YOB: 1985     Delivery Note    Labor Course: Evelyn Strauss is a 34 year old  @ 37w5d admitted to  after SROM of clear fluid on 19 @ 0200. Pt was feeling contractions that increased in frequency from q15 minutes to [id number] prior to arrival to . Initially desired expectant management, however, contractions spaced out after arrival to . SVE on admission /-4, post/med. Received 3 doses of PO misoprostol. Progressed to -/- and requested an epidural. Pt had some relief with epidural and then reported increased rectal pressure. Complete dilation with urge to push at .    Delivery Course:  Pt became complete at  and started pushing at . Delivered a vigorous baby girl @  who was immediately placed on mom's abdomen. No nuchal cord noted. IV pitocin started after delivery of infant per protocol. Umbilical cord was double clamped and cut by FOB after the cord stopped pulsing. No indication for cord segment for gases. Cord blood obtained. Placenta spontaneously delivered intact at  without difficulty via Schultze mechanism. Inspection of vagina and perineum revealed a 2nd degree laceration. Laceration was repaired in the usual fashion with 3-0 vicryl. 1% lidocaine was infiltrated before the repair.  Fundus is firm and midline.  Mom and baby are stable.      IUP at 37+5 weeks gestation delivered on 2019.     delivery of a viable Female infant.  Weight : 7 pounds 9 ounces   Apgars of 9 at 1 minute and 9 at 5 minutes.  Labor was augmented. Misoprostol PO.  Medications administered  in labor:  Pain Rx Epidural; Antibiotics No; Other n/a  Perineum: 2nd degree  Placenta-mechanism: spontaneous, intact,  with a 3 vessel cord. IV oxytocin was given.  QBL was 112ml.  Anticipated Discharge Date: 19  Complications of pregnancy, labor and delivery: ROM > 18 hours  Birth attendants:Faizan  SMITHA Olsen, SMITHA SAUCEDA     ASSESSMENT & PLAN:        Labor Event Times    Labor onset date:  19 Onset time:   2:00 AM   Dilation complete date:  19 Complete time:   8:04 PM   Start pushing date/time:  2019      Labor Length    1st Stage (hrs):  18 (min):  4   2nd Stage (hrs):  0 (min):  41   3rd Stage (hrs):  0 (min):  9      Labor Events     labor?:  No   steroids:  None  Labor Type:  Augmentation  Predominate monitoring during 1st stage:  continuous electronic fetal monitoring     Antibiotics received during labor?:  No     Rupture identifier:  Sac 1  Rupture date/time: 19 0200   Rupture type:  Spontaneous rupture of membranes occuring during spontaneous labor or augmentation  Fluid color:  Clear  Fluid odor:  Normal     1:1 continuous labor support provided by?:  RN       Delivery/Placenta Date and Time    Delivery Date:  19 Delivery Time:   8:45 PM   Placenta Date/Time:  2019  8:54 PM  Oxytocin given at the time of delivery:  after delivery of placenta     Vaginal Counts     Initial count performed by 2 team members:   Two Team Members   SMITHA Naidu RN       Needles Suture [place] Sponges Instruments   Initial counts 2 0 5    Added to count 0 1 0    Final counts 2 1 5    Placed during labor Accounted for at the end of labor   No NA   No NA   No NA    Final count performed by 2 team members:   Two Team Members   SMITHA Naidu RN      Final count correct?:  Yes     Apgars    Living status:  Living   1 Minute 5 Minute 10 Minute 15 Minute 20 Minute   Skin color: 1  1       Heart rate: 2  2       Reflex irritability: 2  2       Muscle tone: 2  2       Respiratory effort: 2  2       Total: 9  9       Apgars assigned by:  SHANTAL MONTES RN     Cord    Vessels:  3 Vessels Complications:  None   Cord Blood Disposition:  Lab Gases Sent?:  No      [place] Resuscitation    Methods:  None  [place] Care at Delivery:  Baby to mother's abdomen,  "spontaneous cry. Dried with warm blankets and moved to mother's chest skin to skin after cord cut. Baby in stable condition.       Measurements    Weight:  7 lb 8 oz Length:  1' 7.5\"   Head circumference:  36.8 cm       Skin to Skin and Feeding Plan    Skin to skin initiation date/time: 1841    Skin to skin with:  Mother  Skin to skin end date/time: 1841    Breastfeeding initiated date/time:  2019 2100  How do you plan to feed your baby:  Breastfeeding     Labor Events and Shoulder Dystocia    Fetal Tracing Prior to Delivery:  Category 2  Fetal Tracing Comments:  occasional variable decelerations with pushing  Shoulder dystocia present?:  Neg     Delivery (Maternal) (Provider to Complete) (799960)    Episiotomy:  None  Perineal lacerations:  2nd Repaired?:  Yes   Vaginal laceration?:  No    Cervical laceration?:  No       Blood Loss  Mother: Evelyn Strauss #[id number]   Start of Mother's Information    IO Blood Loss  19 0200 - 09/10/19 2045    Delivery QBL (mL) Hospital Encounter 112 mL    Postpartum QBL (mL) Hospital Encounter 60 mL    Total  172 mL         End of Mother's Information  Mother: Evelyn Strauss #[id number]         Delivery - Provider to Complete ([id number])    Delivering clinician:  Faizan Olsen CNM CNM Care:  Exclusive JASWANTM care in labor  Attempted Delivery Types (Choose all that apply):  Spontaneous Vaginal Delivery  Delivery Type (Choose the 1 that will go to the Birth History):  Vaginal, Spontaneous   Other personnel:   Provider Role   Faizan Olsen CNM Midwife         Placenta    Delayed Cord Clamping:  Done  Date/Time:  2019  8:54 PM  Removal:  Spontaneous  Comments:  isiah barragan, 3 vessel cord  Disposition:  Hospital disposal     Anesthesia    Method:  Epidural  Cervical dilation at placement:  4-7          Presentation and Position    Presentation:  Vertex   Occiput Anterior           Fazian Olsen CNM  "

## 2019-09-10 NOTE — PLAN OF CARE
Patient arrived to [facility] unit via wheelchair at 2350,with belongings, accompanied by spouse/ significant other, with infant in arms. Received report from HERLINDA Snow and checked bands. Unit and room orientation complete. Call light given and within arms reach; no concerns present at this time. Continue with plan of care.

## 2019-09-10 NOTE — PLAN OF CARE
Pt is stable. Vital signs stable and FF at U/U with small lochia. Pt is up independently to void without difficulties. Denies pain. Pt is using tucks for willis discomfort. Breastfeeding well with minimal assist latching baby deeper and positioning. The left nipples is tender. Pt medicated with Tylenol for pain control. Checked latch, flange baby's lips and assisted with deeper latch. Encouraged pt to soak in the tub twice a day. Continue cares, check latch and assist with breastfeeding as needed.

## 2019-09-10 NOTE — PLAN OF CARE
Data: Evelyn Strauss transferred to [facility] via wheelchair at 0015. Baby transferred via parent's arms.  Action: Receiving unit notified of transfer: Yes. Patient and family notified of room change. Report given to [facility] RN at 0015. Belongings sent to receiving unit. Accompanied by Registered Nurse. Oriented patient to surroundings. Call light within reach. ID bands double-checked with receiving RN.  Response: Patient tolerated transfer and is stable.

## 2019-09-10 NOTE — PLAN OF CARE
Received report from HERLINDA Gilbert, at 1920. Pt still feeling contractions low in her pelvis, but states she is comfortable. Is currently resting. FHR showing late decelerations, intervening with maternal position changes.     RN unable to verify epidural pump settings in the MAR. Current settings are:   Continuous rate 10 ml/hr  PCEA rate 5 mL  PCEA Lockout 15 minutes   1 hour limit 25 mL    Will contact anesthesiologist to confirm new orders are being placed.

## 2019-09-10 NOTE — PLAN OF CARE
Patient VS normal. Fundus U/U, firm and midline. Patient is breastfeeding independently. Bonding well with . Needs some encouragement/reminders to breastfeed and with  cares. Reports tiredness. Denies pain. Declines pain medication. Will continue to monitor

## 2019-09-10 NOTE — PROGRESS NOTES
"Post Partum Note    Evelyn Strauss      MRN#: [id number]  Age: 34 year old      YOB: 1985      Post-partum day #1    SIGNIFICANT PROBLEMS:  Patient Active Problem List    Diagnosis Date Noted     Encounter for triage in pregnant patient 2019     Priority: Medium     Labor and delivery indication for care or intervention 2019     Priority: Medium      (normal spontaneous vaginal delivery) 2019     Priority: Medium     Anemia, hbg 10.8 2019     Priority: Medium     2019: Reviewed hemoglobin of 10.8. Recommended iron supplementation. Patient declines PO iron supplementation at this time and prefers increasing iron rich foods in her diet. Agreeable to repeat CBC at 36 weeks and supplementation at this time if needed.       Abnormal glucose tolerance test, passed 3 hour glucose test 2019     Priority: Medium     19- , needs 3 hr GTT  7/15/19- passed 3 hour glucose test        labor with  delivery, fetus 1, weekly 17P 2019     Priority: Medium     19-US- cervical length wnl  19- MFM US- wnl- cervical length wnl    Weekly 17P until 36th week       HRP (high risk pregnancy), unspecified trimester 2019     Priority: Medium     PPROM at 36w1d with subsequent IOL  3/4/19- NIPT without first trimester screen- negative  19- agreeable to 17OHP and cervical length US. Ordered for MFM  ___  19- peasized cyst left labia- no redness, slightly tender- use heat and observe.  2019: EOB, tdap given         INTERVAL HISTORY:  BP 91/58   Pulse 73   Temp 98.5  F (36.9  C) (Oral)   Resp 16   Ht 1.575 m (5' 2\")   Wt 72.6 kg (160 lb)   LMP 2018   SpO2 95%   Breastfeeding? Unknown   BMI 29.26 kg/m      Pt stable, baby is rooming in  Breast feeding status:initiated and well established  Complications since 2 hours post delivery: None  Patient is tolerating acitivity well Voiding without difficulty, cramping is " relieved by Ibuprophen, lochia is decreasing and patient denies clots.  Perineal pain is is minimal.    postpartum exam   Breasts:soft, filling  Nipples:erect, no lesions, intace  Abdomen: soft, nontender, fundus firm, umb/-1, midline  Perineum:  laceration is well approximated, healing well, approximated, no edema, erythema, bruising, hematoma or s/s of infection  Lochia: min rubra, no clots, no odor  Legs: nontender, trace edema      Postpartum hemoglobin   Hemoglobin   Date Value Ref Range Status   09/10/2019 12.0 11.7 - 15.7 g/dL Final     Blood type   Lab Results   Component Value Date    ABO B 09/09/2019       Lab Results   Component Value Date    RH Pos 09/09/2019         ASSESSMENT/PLAN:  Stable Post-partum day #1  Complications:none  Plan d/c home tomorrow  RTC 2 weeks  Teaching done: D/C Instructions: Nutrition/Activity, Engorgement Management, Birth Control Options, Warning Signs/When to Call: Excessive Bleeding, Infection, PP Depression, Kegals and Crunches, RTC Clinic for PP Appointment and PNV    Postpartum warning s/s reviewed, including bleeding/clots, fever, mastitis, or depression    Birthcontrol planned:Condoms  Current Discharge Medication List      CONTINUE these medications which have NOT CHANGED    Details   Docosahexaenoic Acid (PRENATAL DHA PO) Take 1 tablet by mouth daily    Associated Diagnoses: First trimester pregnancy      vitamin D3 (CHOLECALCIFEROL) 2000 units (50 mcg) tablet Take 1 tablet by mouth daily         STOP taking these medications       metroNIDAZOLE (FLAGYL) 500 MG tablet Comments:   Reason for Stopping:           Wants to research more mobile breastpump, will ask for RX tomorrow when she knows the specific type she wants    Ruby Novak, DNP, APRN, CNM

## 2019-09-11 VITALS
SYSTOLIC BLOOD PRESSURE: 122 MMHG | HEART RATE: 73 BPM | HEIGHT: 62 IN | BODY MASS INDEX: 29.44 KG/M2 | WEIGHT: 160 LBS | DIASTOLIC BLOOD PRESSURE: 82 MMHG | OXYGEN SATURATION: 95 % | RESPIRATION RATE: 18 BRPM | TEMPERATURE: 97.6 F

## 2019-09-11 PROCEDURE — 25000132 ZZH RX MED GY IP 250 OP 250 PS 637: Performed by: ADVANCED PRACTICE MIDWIFE

## 2019-09-11 RX ADMIN — IBUPROFEN 800 MG: 800 TABLET, FILM COATED ORAL at 03:53

## 2019-09-11 RX ADMIN — ACETAMINOPHEN 650 MG: 325 TABLET, FILM COATED ORAL at 00:12

## 2019-09-11 RX ADMIN — IBUPROFEN 800 MG: 800 TABLET, FILM COATED ORAL at 12:04

## 2019-09-11 RX ADMIN — SENNOSIDES AND DOCUSATE SODIUM 1 TABLET: 8.6; 5 TABLET ORAL at 09:12

## 2019-09-11 RX ADMIN — ACETAMINOPHEN 650 MG: 325 TABLET, FILM COATED ORAL at 12:04

## 2019-09-11 NOTE — PLAN OF CARE
Patient is stable. VS normal. Fundus midline and firm with light lochia. Breastfeeding independently with minimal assistance with latching. Reports nipple tenderness. Bonding well with baby. Patient is Up ad alexa. Will continue to monitor

## 2019-09-11 NOTE — PLAN OF CARE
VSS and postpartum assessments WDL.  Up ad alexa with steady gait.  Independent with cares.  Bonding well with infant.  Breastfeeding on cue independently.  Pain managed with tylenol and ibuprofen per MAR.   visited with toddler daughter, staff providing support.  EDS completed, score=1.  Encouraged rest between cares and feedings.  Will continue with postpartum cares and education per plan of care.

## 2019-09-11 NOTE — PLAN OF CARE
Vss, postpartum assessment WDL. Taking tylenol and ibuprofen for pain control. Breast feeding infant independently. Discharge instructions discussed with patient, all questions answered. Patient knows to follow up with provider in 2 and 6 weeks postpartum. Patient discharged home.

## 2019-09-11 NOTE — DISCHARGE SUMMARY
[facility] Discharge Summary    Evelyn Strauss MRN# [id number]   Age: 34 year old YOB: 1985     Date of Admission:  2019  Date of Discharge::  2019  Admitting Physician:  Faizan Olsen, JASWANTM  Discharge Physician:  Kiley Gutierrez, SOHAIL MICHAUDM, APRN CNM      Home clinic: [facility]          Admission Diagnoses:   Maternity*reva:  19/labor  Encounter for triage in pregnant patient  Labor and delivery indication for care or intervention   (normal spontaneous vaginal delivery)          Discharge Diagnosis:   Normal spontaneous vaginal delivery  Intrauterine pregnancy at 37.5 weeks gestation          Procedures:   Procedure(s): Repair of second degree perineal laceration       No other significant procedures performed during this admission           Medications Prior to Admission:     Medications Prior to Admission   Medication Sig Dispense Refill Last Dose     Docosahexaenoic Acid (PRENATAL DHA PO) Take 1 tablet by mouth daily   2019 at Unknown time     vitamin D3 (CHOLECALCIFEROL) 2000 units (50 mcg) tablet Take 1 tablet by mouth daily   2019 at Unknown time     [] metroNIDAZOLE (FLAGYL) 500 MG tablet Take 1 tablet (500 mg) by mouth 2 times daily for 7 days 14 tablet 0              Discharge Medications:     Current Discharge Medication List      CONTINUE these medications which have NOT CHANGED    Details   Docosahexaenoic Acid (PRENATAL DHA PO) Take 1 tablet by mouth daily    Associated Diagnoses: First trimester pregnancy      vitamin D3 (CHOLECALCIFEROL) 2000 units (50 mcg) tablet Take 1 tablet by mouth daily         STOP taking these medications       metroNIDAZOLE (FLAGYL) 500 MG tablet Comments:   Reason for Stopping:                     Consultations:   No consultations were requested during this admission          Brief History of Labor:   Labor Course: Evelyn Strauss is a 34 year old  @ 37w5d admitted to   after SROM of clear fluid on 19 @ 0200. Pt was feeling contractions that increased in frequency from q15 minutes to [id number] prior to arrival to . Initially desired expectant management, however, contractions spaced out after arrival to . SVE on admission /-4, post/med. Received 3 doses of PO misoprostol. Progressed to 5-/-1 and requested an epidural. Pt had some relief with epidural and then reported increased rectal pressure. Complete dilation with urge to push at .     Delivery Course:  Pt became complete at  and started pushing at . Delivered a vigorous baby girl @  who was immediately placed on mom's abdomen. No nuchal cord noted. IV pitocin started after delivery of infant per protocol. Umbilical cord was double clamped and cut by FOB after the cord stopped pulsing. No indication for cord segment for gases. Cord blood obtained. Placenta spontaneously delivered intact at  without difficulty via Schultze mechanism. Inspection of vagina and perineum revealed a 2nd degree laceration. Laceration was repaired in the usual fashion with 3-0 vicryl. 1% lidocaine was infiltrated before the repair.  Fundus is firm and midline.  Mom and baby are stable.      IUP at 37+5 weeks gestation delivered on 2019.     delivery of a viable Female infant.  Weight : 7 pounds 9 ounces   Apgars of 9 at 1 minute and 9 at 5 minutes.  Labor was augmented. Misoprostol PO.  Medications administered  in labor:  Pain Rx Epidural; Antibiotics No; Other n/a  Perineum: 2nd degree  Placenta-mechanism: spontaneous, intact,  with a 3 vessel cord. IV oxytocin was given.  QBL was 112ml.  Anticipated Discharge Date: 19  Complications of pregnancy, labor and delivery: ROM > 18 hours  Birth attendants:Faizan Olsen, SMITHA, APRN,CNM      Assessment Day of Discharge      Breasts: soft, filling  Nipples: intact  Fundus: firm  Abdomen: soft  Lochia: minimal  Perineum: laceration well  apprximated  Legs: trace edema           Hospital Course:   The patient's hospital course was unremarkable.  On discharge, her pain was well controlled. Vaginal bleeding is similar to peak menstrual flow.  Voiding without difficulty.  Ambulating well and tolerating a normal diet.  No fever.  Breastfeeding well.  Infant is stable.   She was discharged on post-partum day #2.    Post-partum hemoglobin:   Hemoglobin   Date Value Ref Range Status   09/10/2019 12.0 11.7 - 15.7 g/dL Final             Discharge Instructions and Follow-Up:   Discharge diet: Regular   Discharge activity: Activity as tolerated   Discharge follow-up: Follow up with SMITHA in 2 weeks   Wound care: Drink plenty of fluids           Discharge Disposition:   Discharged to home        SOHAIL Aceves CNM

## 2019-09-13 ENCOUNTER — HOSPITAL ENCOUNTER (OUTPATIENT)
Dept: ULTRASOUND IMAGING | Facility: CLINIC | Age: 34
Discharge: HOME | End: 2019-09-13
Attending: OBSTETRICS & GYNECOLOGY | Admitting: OBSTETRICS & GYNECOLOGY
Payer: COMMERCIAL

## 2019-09-13 ENCOUNTER — TELEPHONE (OUTPATIENT)
Dept: OBGYN | Facility: CLINIC | Age: 34
End: 2019-09-13

## 2019-09-13 DIAGNOSIS — R60.0 EDEMA OF RIGHT LOWER EXTREMITY: ICD-10-CM

## 2019-09-13 DIAGNOSIS — R60.0 EDEMA OF RIGHT LOWER EXTREMITY: Primary | ICD-10-CM

## 2019-09-13 PROCEDURE — 93971 EXTREMITY STUDY: CPT | Mod: RT

## 2019-09-13 NOTE — TELEPHONE ENCOUNTER
Called clement to discuss negative US of lower right extremity. Advised symptoms likely related to post partum edema- can manage symptoms by elevating, ambulating as tolerated, increasing hydration and reducing salt in diet. Should continue to monitor symptoms and call with any change. Advised can reach on-call over weekend if concerns.    Pt expressed understanding and agrees with plan

## 2019-09-13 NOTE — TELEPHONE ENCOUNTER
19. Girl, 2nd degree laceration.   Postpartum hgb 12.0  Discharge on 19  Pt called with questions on blood clot. TC to pt, but no answer. Left message to call back. Danni Hope, SOHAIL MICHAUDM

## 2019-09-13 NOTE — TELEPHONE ENCOUNTER
Pt called to report new onset significant right lower extremity swelling and loss of sensation. Pt denies pain, redness, warmth, hardened area. Pt delivered vaginally on 9/10/19, discharged on 9/11.    Discussed with Dr. Morris who advised outpatient radiology for venous ultrasound. Coordinated for patient and advised of scheduled US. They will present for US at 2:00.

## 2019-09-23 ENCOUNTER — OFFICE VISIT (OUTPATIENT)
Dept: OBGYN | Facility: CLINIC | Age: 34
End: 2019-09-23
Attending: ADVANCED PRACTICE MIDWIFE
Payer: COMMERCIAL

## 2019-09-23 VITALS
WEIGHT: 154 LBS | SYSTOLIC BLOOD PRESSURE: 122 MMHG | HEART RATE: 91 BPM | DIASTOLIC BLOOD PRESSURE: 79 MMHG | BODY MASS INDEX: 28.17 KG/M2

## 2019-09-23 DIAGNOSIS — Z23 NEED FOR IMMUNIZATION AGAINST INFLUENZA: ICD-10-CM

## 2019-09-23 PROCEDURE — G0008 ADMIN INFLUENZA VIRUS VAC: HCPCS

## 2019-09-23 PROCEDURE — 25000128 H RX IP 250 OP 636: Mod: ZF

## 2019-09-23 PROCEDURE — G0463 HOSPITAL OUTPT CLINIC VISIT: HCPCS | Mod: 25

## 2019-09-23 PROCEDURE — 90686 IIV4 VACC NO PRSV 0.5 ML IM: CPT | Mod: ZF

## 2019-09-23 ASSESSMENT — PAIN SCALES - GENERAL: PAINLEVEL: NO PAIN (0)

## 2019-09-23 NOTE — PROGRESS NOTES
SUBJECTIVE  34 year old  presents for 2 week post partum visit s/p  delivery on /9/10/19 for breastfeeding and mood check.  Pt is doing well. Breastfeeding with good latch to both breasts. No nipple pain. Lochia Coping well with support from .     OBJECTIVE  General- no apparent distress  Breast- soft, NT, nipples intact, no cracking redness or bleeding   Extremities- trace edema bilaterally  Pelvic/perineal check deferred at patient request  /79   Pulse 91   Wt 69.9 kg (154 lb)   LMP 2018   BMI 28.17 kg/m        ASSESSMENT/PLAN  2 weeks postpartum s/p  delivery  - No signs or symptoms of PPD. Breastfeeding   - RTO in 4 weeks for 6 week check and prn if questions or concerns  SOHAIL Aceves CNM

## 2019-09-23 NOTE — LETTER
2019    RE: Evelyn Strauss  [address] [facility] [address]     Dear Colleague,    Thank you for referring your patient, Evelyn Strauss, to the [facility] at [facility]. Please see a copy of my visit note below.    SUBJECTIVE  34 year old  presents for 2 week post partum visit s/p  delivery on /9/10/19 for breastfeeding and mood check.  Pt is doing well. Breastfeeding with good latch to both breasts. No nipple pain. Lochia Coping well with support from .   OBJECTIVE  General- no apparent distress  Breast- soft, NT, nipples intact, no cracking redness or bleeding   Extremities- trace edema bilaterally  Pelvic/perineal check deferred at patient request  /79   Pulse 91   Wt 69.9 kg (154 lb)   LMP 2018   BMI 28.17 kg/m     ASSESSMENT/PLAN  2 weeks postpartum s/p  delivery  - No signs or symptoms of PPD. Breastfeeding   - RTO in 4 weeks for 6 week check and prn if questions or concerns  SOHAIL Aceves CNM

## 2019-09-23 NOTE — NURSING NOTE
Clinic Administered Medication Documentation    MEDICATION LIST:   Injectable Medication Documentation    Patient was given influenza. Prior to medication administration, verified patients identity using patient s name and date of birth. Please see MAR and medication order for additional information. Patient instructed to remain in clinic for 15 minutes.      Was entire vial of medication used? Yes  Vial/Syringe: Single dose vial  Expiration Date:  6-  Was this medication supplied by the patient? No

## 2019-10-21 ENCOUNTER — OFFICE VISIT (OUTPATIENT)
Dept: OBGYN | Facility: CLINIC | Age: 34
End: 2019-10-21
Attending: ADVANCED PRACTICE MIDWIFE
Payer: COMMERCIAL

## 2019-10-21 VITALS
HEART RATE: 86 BPM | WEIGHT: 155.8 LBS | HEIGHT: 62 IN | SYSTOLIC BLOOD PRESSURE: 124 MMHG | DIASTOLIC BLOOD PRESSURE: 88 MMHG | BODY MASS INDEX: 28.67 KG/M2

## 2019-10-21 DIAGNOSIS — N89.8 VAGINAL ODOR: Primary | ICD-10-CM

## 2019-10-21 PROBLEM — Z36.89 ENCOUNTER FOR TRIAGE IN PREGNANT PATIENT: Status: RESOLVED | Noted: 2019-09-09 | Resolved: 2019-10-21

## 2019-10-21 PROBLEM — R73.09 ABNORMAL GLUCOSE TOLERANCE TEST: Status: RESOLVED | Noted: 2019-07-01 | Resolved: 2019-10-21

## 2019-10-21 PROBLEM — D64.9 ANEMIA: Status: RESOLVED | Noted: 2019-07-16 | Resolved: 2019-10-21

## 2019-10-21 PROBLEM — O09.90 HRP (HIGH RISK PREGNANCY), UNSPECIFIED TRIMESTER: Chronic | Status: RESOLVED | Noted: 2019-02-18 | Resolved: 2019-10-21

## 2019-10-21 PROCEDURE — G0463 HOSPITAL OUTPT CLINIC VISIT: HCPCS | Mod: ZF

## 2019-10-21 ASSESSMENT — MIFFLIN-ST. JEOR: SCORE: 1349.95

## 2019-10-21 NOTE — LETTER
10/21/2019       RE: Evelyn Strauss  [address] [facility] [address]     Dear Colleague,    Thank you for referring your patient, Evelyn Strauss, to the [facility] at [facility]. Please see a copy of my visit note below.    Nursing Notes:   Ibis RubinLIZZ  10/21/2019 11:35 AM  Signed  Chief Complaint   Patient presents with     Vaginal Problem     SUBJECTIVE:   Evelyn Strauss is here for her 6-week postpartum checkup.     PHQ-9 score: 0  Hx of Abuse:  No    Delivery Date: 19.    Delivering provider:  Faizan Olsen CNM.    Type of delivery:  .  Perineum:  tear, with repair.     Delivery complications: None  Infant gender:  girl, weight 7 lb pounds 9 oz.  Feeding Method:  .  Complications reported with feeding:  none, infant thriving .    Bleeding:  Stopped last week.  Duration:  6 weeks.  Menses resumed:  No  Bowel/Urinary problems:  Yes - diarrhea several times in past 6 weeks    Contraception Planned:  condoms  She  has not had intercourse since delivery..      Baby last weight 8 1/2 lb and has gained weight since last appointment  Breastfeeding exclusively      Has not resumed intercourse  ================================================================  ROS: 10 point ROS neg other than the symptoms noted below.     Foul smelling vaginal odor started 2 weeks ago, yellow/whitish discharge on underwear dime sized, no vaginal burning or itching, bleeding stopped last week, no dysuria, no fever, diarrhea several times through out the last six weeks was taking stool softeners up until 2 days ago, lower abdominal cramping x3 in past six weeks  Once incident of shortness of breath that improved with inhaler use two weeks ago  Everyone in family has been sick with cough, wheezing, diarrhea, no fevers  No changes in vision or hearing  No headaches  Normal appetite  No chest pain  Tingling in toes and feet, swelling has went down  Mood  "has been good  Breastfeeding going well denies feeling any lumps and reports intact    EXAM:  /88   Pulse 86   Ht 1.575 m (5' 2\")   Wt 70.7 kg (155 lb 12.8 oz)   LMP 2018   BMI 28.50 kg/m       General: healthy, alert, no distress and mild distress reports arguing with  on timing  Psych: NEGATIVE  Last PHQ-9 score on record= No Value exists for the : HP#PHQ9  Breasts:  Lactating, Nipples intact with no lesions, Non-tender and No S/S of yeast or mastitis  Abdomen: Benign, Soft, flat, non-tender, No masses, organomegaly and Diastasis less than 1-2 FB  Incision:  None   Vulva:  Normal genitalia and Bartholin's, Urethra, Gustine's normal  Vagina:  normal with good muscle tone, without discharge and epis/repair well healed  Cervix:  Without CMT.    Uterus:  fully involuted and non-tender    Adnexa:  Within normal limits and No masses, nodularity, tenderness  Recto-vaginal:   anus normal    ASSESSMENT:   Encounter Diagnoses   Name Primary?     Vaginal odor Yes     Postpartum care and examination       Normal postpartum exam after   Pregnancy was complicated by:  none.      PLAN:  Orders Placed This Encounter   Procedures     Wet Prep POCT      No orders of the defined types were placed in this encounter.       Risks and benefits of prescribed medications discussed.  Medication instructions reviewed.  Contraception methods discussed  Signs and symptoms of postpartum depression/anxiety discussed and resources offered  Discussed calcium intake, vitamins and supplements including Vitamin D  Exercise encouraged  Follow up in 1 year    Verena TORO, am serving as a scribe; to document services personally performed by  SOHAIL Houston CNM based on data collection and the provider's statements to me.     Verena Enriquez  The encounter was performed by me and scribed by the SNM. The scribed note accurately reflects my personal services and decisions made by me. SOHAIL Aceves CNM      "

## 2019-10-21 NOTE — PROGRESS NOTES
"Nursing Notes:   Ibis Rubin, [facility]  10/21/2019 11:35 AM  Signed  Chief Complaint   Patient presents with     Vaginal Problem     SUBJECTIVE:   Evelyn Strauss is here for her 6-week postpartum checkup.     PHQ-9 score: 0  Hx of Abuse:  No    Delivery Date: 19.    Delivering provider:  Faizan Olsen CNM.    Type of delivery:  .  Perineum:  tear, with repair.     Delivery complications: None  Infant gender:  girl, weight 7 lb pounds 9 oz.  Feeding Method:  .  Complications reported with feeding:  none, infant thriving .    Bleeding:  Stopped last week.  Duration:  6 weeks.  Menses resumed:  No  Bowel/Urinary problems:  Yes - diarrhea several times in past 6 weeks    Contraception Planned:  condoms  She  has not had intercourse since delivery..      Baby last weight 8 1/2 lb and has gained weight since last appointment  Breastfeeding exclusively      Has not resumed intercourse  ================================================================  ROS: 10 point ROS neg other than the symptoms noted below.       Foul smelling vaginal odor started 2 weeks ago, yellow/whitish discharge on underwear dime sized, no vaginal burning or itching, bleeding stopped last week, no dysuria, no fever, diarrhea several times through out the last six weeks was taking stool softeners up until 2 days ago, lower abdominal cramping x3 in past six weeks  Once incident of shortness of breath that improved with inhaler use two weeks ago  Everyone in family has been sick with cough, wheezing, diarrhea, no fevers  No changes in vision or hearing  No headaches  Normal appetite  No chest pain  Tingling in toes and feet, swelling has went down  Mood has been good  Breastfeeding going well denies feeling any lumps and reports intact      EXAM:  /88   Pulse 86   Ht 1.575 m (5' 2\")   Wt 70.7 kg (155 lb 12.8 oz)   LMP 2018   BMI 28.50 kg/m      General: healthy, alert, no distress and mild distress reports arguing " with  on timing  Psych: NEGATIVE  Last PHQ-9 score on record= No Value exists for the : HP#PHQ9  Breasts:  Lactating, Nipples intact with no lesions, Non-tender and No S/S of yeast or mastitis  Abdomen: Benign, Soft, flat, non-tender, No masses, organomegaly and Diastasis less than 1-2 FB  Incision:  None   Vulva:  Normal genitalia and Bartholin's, Urethra, Between's normal  Vagina:  normal with good muscle tone, without discharge and epis/repair well healed  Cervix:  Without CMT.    Uterus:  fully involuted and non-tender    Adnexa:  Within normal limits and No masses, nodularity, tenderness  Recto-vaginal:   anus normal      ASSESSMENT:   Encounter Diagnoses   Name Primary?     Vaginal odor Yes     Postpartum care and examination       Normal postpartum exam after   Pregnancy was complicated by:  none.      PLAN:  Orders Placed This Encounter   Procedures     Wet Prep POCT      No orders of the defined types were placed in this encounter.       Risks and benefits of prescribed medications discussed.  Medication instructions reviewed.  Contraception methods discussed  Signs and symptoms of postpartum depression/anxiety discussed and resources offered  Discussed calcium intake, vitamins and supplements including Vitamin D  Exercise encouraged  Follow up in 1 year    Verena TORO, am serving as a scribe; to document services personally performed by  SOHAIL Houston CNM based on data collection and the provider's statements to me.     Verena Enriquez  The encounter was performed by me and scribed by the SNM. The scribed note accurately reflects my personal services and decisions made by me. SOHAIL Aceves CNM

## 2019-12-18 ENCOUNTER — DOCUMENTATION ONLY (OUTPATIENT)
Dept: OBGYN | Facility: CLINIC | Age: 34
End: 2019-12-18

## 2019-12-18 NOTE — PROGRESS NOTES
[facility] will be sharing updates with you on Maternal Child Health Referral requests for home care services.  This is for care coordination purposes and alert you to referral status.  We received the referral for  Evelyn Strauss; MRN [id number] and want to update you:      [facility] is unable to see patient  [employer] is not contracted with [facility] for this service. Patient advised to contact their insurance provider to determine if service is covered through another homecare agency.   Offered option of private pay nurse assessment and education for mom or baby at service rate of 150.00 per visit or 180.00 for both.  Provided call back information if private pay visit is requested.    Referral source IF STILL INPATIENT, ordering MD, and Primary Care Providers for mom and baby notified via [facility] ENCOUNTER OR CALL.        Sincerely [facility]  Rai Rubalcava  [phone]

## 2020-01-13 ENCOUNTER — MEDICAL CORRESPONDENCE (OUTPATIENT)
Dept: HEALTH INFORMATION MANAGEMENT | Facility: CLINIC | Age: 35
End: 2020-01-13

## 2020-03-10 ENCOUNTER — HEALTH MAINTENANCE LETTER (OUTPATIENT)
Age: 35
End: 2020-03-10

## 2020-03-16 ENCOUNTER — MEDICAL CORRESPONDENCE (OUTPATIENT)
Dept: HEALTH INFORMATION MANAGEMENT | Facility: CLINIC | Age: 35
End: 2020-03-16

## 2020-11-27 ENCOUNTER — THERAPY VISIT (OUTPATIENT)
Dept: PHYSICAL THERAPY | Facility: CLINIC | Age: 35
End: 2020-11-27
Payer: COMMERCIAL

## 2020-11-27 DIAGNOSIS — M54.42 BILATERAL LOW BACK PAIN WITH BILATERAL SCIATICA: Primary | ICD-10-CM

## 2020-11-27 DIAGNOSIS — M54.41 BILATERAL LOW BACK PAIN WITH BILATERAL SCIATICA: Primary | ICD-10-CM

## 2020-11-27 PROCEDURE — 97140 MANUAL THERAPY 1/> REGIONS: CPT | Mod: GP | Performed by: PHYSICAL THERAPIST

## 2020-11-27 PROCEDURE — 97530 THERAPEUTIC ACTIVITIES: CPT | Mod: GP | Performed by: PHYSICAL THERAPIST

## 2020-11-27 PROCEDURE — 97110 THERAPEUTIC EXERCISES: CPT | Mod: GP | Performed by: PHYSICAL THERAPIST

## 2020-11-27 PROCEDURE — 97161 PT EVAL LOW COMPLEX 20 MIN: CPT | Mod: GP | Performed by: PHYSICAL THERAPIST

## 2020-11-27 NOTE — PROGRESS NOTES
[facility] Initial Evaluation  Subjective:  Evelyn Strauss is a 35 year old female.  General health as reported by patient is good.  Pertinent medical history is unremarkable.  Mechanism/History of injury/symptoms:  Pt notes she helped her child with gymnastics and felt back pain later that week. Later noticed back apin after placing her child in her crib.   Patient's chief complaints:  Middle-low back, central location. Notices bilateral shooting pain and numbness/tingling down to knees  Symptoms are exacerbated by any sort of bending or low back use.  Symptoms are relieved by standing upright.  Current functional restrictions:  Lifting anything at home, certain activities with child.  Previous functional level as reported by patient:  normal.    The pain is located mid-low back and the quality of pain is reported as sharp and shooting.  The degree of pain is reported as 5/10. The timing of pain/symptoms is constant. Associated symptoms include: numbness/tingling. Sleep is reported as : uncomfortable to sleep.    . Medical allergies include: none.  Other surgeries include: none.  Current medications:  none    Current occupation: teach.  Patient's home/work tasks include: .  Patient is currently working.    Potential barriers to physical therapy: none.  Red flags: none.        Objective:  LUMBAR:    Posture: slouched     Gait: normal     SL Balance:  R: 30 sec (R/L hip drop)  L: 30 sec (R/L hip drop)    Functional:  - Squat/ SL squat Knee dominant, pt needs extensive cuing for proper form, heels lift off ground    Neurological:    Motor Deficit:  Myotomes L R   L1-2 (hip flexion) Normal Normal   L3 (knee extension) Normal Normal   L4 (ankle DF) Normal Normal   L5 (g. toe ext) Normal Normal   S1 (ankle PF or knee flex) Normal Normal     Sensory Deficit, Reflexes: Normal    Dural Signs:   L R   Slump - -   SLR - -   Other:    - TA activation:   - Prone instability test:     AROM: (Major,  Moderate, Minimal or Nil loss)  Movement Loss Sumit Mod Min Nil Pain   Flexion x    x   Extension   x     Side Gliding/Bend L    x    Side Gliding/Bend R    x        Lumbar Mobility/Spring Testing: normal    Palpation: slight tenderness to touch over lumbar spinous processes          Assessment/Plan:    Patient is a 35 year old female with lumbar complaints.    Patient has the following significant findings with corresponding treatment plan.                Diagnosis 1:  Low back pain  Pain -  hot/cold therapy, manual therapy, education, directional preference exercise and home program  Decreased ROM/flexibility - manual therapy and therapeutic exercise  Decreased strength - therapeutic exercise and therapeutic activities    Therapy Evaluation Codes:     Cumulative Therapy Evaluation is: Low complexity.    Previous and current functional limitations:  (See Goal Flow Sheet for this information)    Short term and Long term goals: (See Goal Flow Sheet for this information)     Communication ability:  Patient appears to be able to clearly communicate and understand verbal and written communication and follow directions correctly.  Treatment Explanation - The following has been discussed with the patient:   RX ordered/plan of care  Anticipated outcomes  Possible risks and side effects  This patient would benefit from PT intervention to resume normal activities.   Rehab potential is good.    Frequency:  1 X week, once daily  Duration:  for 6 visits  Discharge Plan:  Achieve all LTG.  Independent in home treatment program.  Reach maximal therapeutic benefit.    Please refer to the daily flowsheet for treatment today, total treatment time and time spent performing 1:1 timed codes.

## 2020-11-27 NOTE — LETTER
[facility]  [address] [address]  [phone]    2020    Re: Evelyn Strauss   :   1985  MRN:  [id number]   REFERRING PHYSICIAN:   Joanne Wagner    [facility]    Date of Initial Evaluation:  20  Visits:  Rxs Used: 1  Reason for Referral:  Bilateral low back pain with bilateral sciatica    EVALUATION SUMMARY    [facility] Initial Evaluation  Subjective:  Evelyn Strauss is a 35 year old female.  General health as reported by patient is good.  Pertinent medical history is unremarkable.  Mechanism/History of injury/symptoms:  Pt notes she helped her child with gymnastics and felt back pain later that week. Later noticed back apin after placing her child in her crib.   Patient's chief complaints:  Middle-low back, central location. Notices bilateral shooting pain and numbness/tingling down to knees  Symptoms are exacerbated by any sort of bending or low back use.  Symptoms are relieved by standing upright.  Current functional restrictions:  Lifting anything at home, certain activities with child.  Previous functional level as reported by patient:  normal.    The pain is located mid-low back and the quality of pain is reported as sharp and shooting.  The degree of pain is reported as 5/10. The timing of pain/symptoms is constant. Associated symptoms include: numbness/tingling. Sleep is reported as : uncomfortable to sleep.    Medical allergies include: none.  Other surgeries include: none.  Current medications:  none    Current occupation: teach.  Patient's home/work tasks include: .  Patient is currently working.    Potential barriers to physical therapy: none.  Red flags: none.    Objective:  LUMBAR:    Posture: slouched     Gait: normal     SL Balance:  R: 30 sec (R/L hip drop)  L: 30 sec (R/L hip drop)    Functional:  - Squat/ SL squat Knee dominant, pt needs extensive  cuing for proper form, heels lift off ground    Neurological:    Motor Deficit:  Myotomes L R   L1-2 (hip flexion) Normal Normal   L3 (knee extension) Normal Normal   L4 (ankle DF) Normal Normal   L5 (g. toe ext) Normal Normal   S1 (ankle PF or knee flex) Normal Normal     Sensory Deficit, Reflexes: Normal    Dural Signs:   L R   Slump - -   SLR - -   Other:    - TA activation:   - Prone instability test:     AROM: (Major, Moderate, Minimal or Nil loss)  Movement Loss Sumit Mod Min Nil Pain   Flexion x    x   Extension   x     Side Gliding/Bend L    x    Side Gliding/Bend R    x        Lumbar Mobility/Spring Testing: normal    Palpation: slight tenderness to touch over lumbar spinous processes    Assessment/Plan:    Patient is a 35 year old female with lumbar complaints.    Patient has the following significant findings with corresponding treatment plan.                Diagnosis 1:  Low back pain  Pain -  hot/cold therapy, manual therapy, education, directional preference exercise and home program  Decreased ROM/flexibility - manual therapy and therapeutic exercise  Decreased strength - therapeutic exercise and therapeutic activities    Therapy Evaluation Codes:     Cumulative Therapy Evaluation is: Low complexity.    Previous and current functional limitations:  (See Goal Flow Sheet for this information)    Short term and Long term goals: (See Goal Flow Sheet for this information)     Communication ability:  Patient appears to be able to clearly communicate and understand verbal and written communication and follow directions correctly.  Treatment Explanation - The following has been discussed with the patient:   RX ordered/plan of care  Anticipated outcomes  Possible risks and side effects  This patient would benefit from PT intervention to resume normal activities.   Rehab potential is good.    Frequency:  1 X week, once daily  Duration:  for 6 visits  Discharge Plan:  Achieve all LTG.  Independent in home  treatment program.  Reach maximal therapeutic benefit.    Thank you for your referral.    INQUIRIES  Therapist: Fer Miranda PT   [facility] [address] [address]  Phone: [phone]  Fax: [phone]

## 2020-12-27 ENCOUNTER — HEALTH MAINTENANCE LETTER (OUTPATIENT)
Age: 35
End: 2020-12-27

## 2021-04-24 ENCOUNTER — HEALTH MAINTENANCE LETTER (OUTPATIENT)
Age: 36
End: 2021-04-24

## 2021-09-03 NOTE — DISCHARGE INSTRUCTIONS
Postpartum Vaginal Delivery Instructions    Activity       Ask family and friends for help when you need it.    Do not place anything in your vagina for 6 weeks.    You are not restricted on other activities, but take it easy for a few weeks to allow your body to recover from delivery.  You are able to do any activities you feel up to that point.    No driving until you have stopped taking your pain medications (usually two weeks after delivery).     Call your health care provider if you have any of these symptoms:       Increased pain, swelling, redness, or fluid around your stiches from an episiotomy or perineal tear.    A fever above 100.4 F (38 C) with or without chills when placing a thermometer under your tongue.    You soak a sanitary pad with blood within 1 hour, or you see blood clots larger than a golf ball.    Bleeding that lasts more than 6 weeks.    Vaginal discharge that smells bad.    Severe pain, cramping or tenderness in your lower belly area.    A need to urinate more frequently (use the toilet more often), more urgently (use the toilet very quickly), or it burns when you urinate.    Nausea and vomiting.    Redness, swelling or pain around a vein in your leg.    Problems breastfeeding or a red or painful area on your breast.    Chest pain and cough or are gasping for air.    Problems coping with sadness, anxiety, or depression.  If you have any concerns about hurting yourself or the baby, call your provider immediately.     You have questions or concerns after you return home.     Keep your hands clean:  Always wash your hands before touching your perineal area and stitches.  This helps reduce your risk of infection.  If your hands aren't dirty, you may use an alcohol hand-rub to clean your hands. Keep your nails clean and short.       [General Appearance - Well Developed] : well developed [General Appearance - Well Nourished] : well nourished [Normal Appearance] : normal appearance [Well Groomed] : well groomed [General Appearance - In No Acute Distress] : no acute distress [Abdomen Soft] : soft [Abdomen Tenderness] : non-tender [Costovertebral Angle Tenderness] : no ~M costovertebral angle tenderness [Urinary Bladder Findings] : the bladder was normal on palpation [] : no respiratory distress [Edema] : no peripheral edema [Respiration, Rhythm And Depth] : normal respiratory rhythm and effort [Exaggerated Use Of Accessory Muscles For Inspiration] : no accessory muscle use [Oriented To Time, Place, And Person] : oriented to person, place, and time [Affect] : the affect was normal [Mood] : the mood was normal [Not Anxious] : not anxious [Normal Station and Gait] : the gait and station were normal for the patient's age [No Focal Deficits] : no focal deficits [No Palpable Adenopathy] : no palpable adenopathy

## 2021-10-04 ENCOUNTER — HEALTH MAINTENANCE LETTER (OUTPATIENT)
Age: 36
End: 2021-10-04

## 2021-11-05 ENCOUNTER — NURSE TRIAGE (OUTPATIENT)
Dept: NURSING | Facility: CLINIC | Age: 36
End: 2021-11-05

## 2021-11-05 DIAGNOSIS — O36.80X0 PREGNANCY WITH INCONCLUSIVE FETAL VIABILITY: Primary | ICD-10-CM

## 2021-11-05 NOTE — TELEPHONE ENCOUNTER
Evelyn reports  OB Triage Call      Is patient's OB/Midwife with the [facility] or [facility]? [facility]- Proceed with triage     Reason for call:   Symptoms started last night  Abdominal cramping similar to menstrual cramps  Bright red blood when she wipes, when using the toilet.    Assessment:  G3; 10 weeks age of gestation  1st prenatal appointment on 11/10 at [facility]    Abdominal cramping - hypogastric area  - mild to moderate  - randomly happens  - present at the time of call, cramping for 5 minutes and ongoing    Spotting  - bright red bleeding only when she wipes, whenever she uses the toilet  - used toilet paper to line her underwear, feels moist but no blood.  - last sexual intercourse:       Plan: 2nd level OB triage    Patient plans to deliver at [facility]    Patient's primary OB Provider is [facility] [place].      Per protocol recommendations:  Consult needed for [facility] MD or Midwife.     [facility] routed message to [facility] TRIAGE to call pt back within 1 hr at [phone]    Is patient's delivering hospital on divert? Does not apply due to early pregnancy      Unknown    Estimated Date of Delivery: Data Unavailable        OB History    Para Term  AB Living   2 2 1 1 0 2   SAB TAB Ectopic Multiple Live Births   0 0 0 0 2      # Outcome Date GA Lbr Devan/2nd Weight Sex Delivery Anes PTL Lv   2 Term 19 37w5d 18:04 / 00:41 3.402 kg (7 lb 8 oz) F Vag-Spont EPI N JAYA      Complications: Prolonged PROM (>18 hours)      Name: Pippa      Apgar1: 9  Apgar5: 9   1  17 36w1d 04:15 / 02:40 2.977 kg (6 lb 9 oz) F Vag-Spont EPI, Local  JAYA      Birth Comments: PPROM       Name: Adalpattie      Apgar1: 9  Apgar5: 9       Lab Results   Component Value Date    GBS Negative 2019          Luli Kiser RN 21 8:10 AM  [facility] Nurse Advisor        Reason for Disposition    Vaginal bleeding or spotting    Additional Information    Negative: Passed out (i.e., fainted, collapsed and was  not responding)    Negative: Shock suspected (e.g., cold/pale/clammy skin, too weak to stand, low BP, rapid pulse)    Negative: Difficult to awaken or acting confused (e.g., disoriented, slurred speech)    Negative: Sounds like a life-threatening emergency to the triager    Negative: MODERATE-SEVERE abdominal pain    Negative: SEVERE vaginal bleeding (e.g., soaking 2 pads per hour, large blood clots) and present 2 or more hours    Negative: MODERATE vaginal bleeding (e.g., soaking 1 pad per hour; clots) and present > 6 hours    Negative: MODERATE vaginal bleeding and pregnant > 12 weeks    Negative: Passed tissue (e.g., gray-white)    Negative: Vomiting and contains red blood or black ('coffee ground') material    Negative: Shoulder pain    Negative: MILD constant abdominal pain (e.g., doesn't interfere with normal activities) and present > 2 hours    Negative: Intermittent lower abdominal pain lasting > 24 hours    Protocols used: PREGNANCY - ABDOMINAL PAIN LESS THAN 20 WEEKS EGA-A-OH

## 2021-11-08 ENCOUNTER — HOSPITAL ENCOUNTER (EMERGENCY)
Facility: CLINIC | Age: 36
Discharge: HOME | End: 2021-11-08
Attending: EMERGENCY MEDICINE | Admitting: EMERGENCY MEDICINE
Payer: COMMERCIAL

## 2021-11-08 ENCOUNTER — APPOINTMENT (OUTPATIENT)
Dept: ULTRASOUND IMAGING | Facility: CLINIC | Age: 36
End: 2021-11-08
Attending: EMERGENCY MEDICINE
Payer: COMMERCIAL

## 2021-11-08 VITALS
WEIGHT: 135 LBS | DIASTOLIC BLOOD PRESSURE: 79 MMHG | TEMPERATURE: 98.7 F | OXYGEN SATURATION: 99 % | RESPIRATION RATE: 18 BRPM | HEART RATE: 90 BPM | BODY MASS INDEX: 24.84 KG/M2 | SYSTOLIC BLOOD PRESSURE: 119 MMHG | HEIGHT: 62 IN

## 2021-11-08 DIAGNOSIS — O20.0 THREATENED ABORTION: ICD-10-CM

## 2021-11-08 LAB
ANION GAP SERPL CALCULATED.3IONS-SCNC: 4 MMOL/L (ref 3–14)
B-HCG SERPL-ACNC: ABNORMAL IU/L (ref 0–5)
BASOPHILS # BLD AUTO: 0 10E3/UL (ref 0–0.2)
BASOPHILS NFR BLD AUTO: 1 %
BUN SERPL-MCNC: 8 MG/DL (ref 7–30)
CALCIUM SERPL-MCNC: 8.8 MG/DL (ref 8.5–10.1)
CHLORIDE BLD-SCNC: 104 MMOL/L (ref 94–109)
CO2 SERPL-SCNC: 27 MMOL/L (ref 20–32)
CREAT SERPL-MCNC: 0.5 MG/DL (ref 0.52–1.04)
EOSINOPHIL # BLD AUTO: 0.2 10E3/UL (ref 0–0.7)
EOSINOPHIL NFR BLD AUTO: 4 %
ERYTHROCYTE [DISTWIDTH] IN BLOOD BY AUTOMATED COUNT: 12.1 % (ref 10–15)
GFR SERPL CREATININE-BSD FRML MDRD: >90 ML/MIN/1.73M2
GLUCOSE BLD-MCNC: 97 MG/DL (ref 70–99)
HCT VFR BLD AUTO: 33.3 % (ref 35–47)
HGB BLD-MCNC: 11.2 G/DL (ref 11.7–15.7)
IMM GRANULOCYTES # BLD: 0 10E3/UL
IMM GRANULOCYTES NFR BLD: 0 %
LYMPHOCYTES # BLD AUTO: 2.5 10E3/UL (ref 0.8–5.3)
LYMPHOCYTES NFR BLD AUTO: 44 %
MCH RBC QN AUTO: 28.9 PG (ref 26.5–33)
MCHC RBC AUTO-ENTMCNC: 33.6 G/DL (ref 31.5–36.5)
MCV RBC AUTO: 86 FL (ref 78–100)
MONOCYTES # BLD AUTO: 0.5 10E3/UL (ref 0–1.3)
MONOCYTES NFR BLD AUTO: 9 %
NEUTROPHILS # BLD AUTO: 2.3 10E3/UL (ref 1.6–8.3)
NEUTROPHILS NFR BLD AUTO: 42 %
NRBC # BLD AUTO: 0 10E3/UL
NRBC BLD AUTO-RTO: 0 /100
PLATELET # BLD AUTO: 205 10E3/UL (ref 150–450)
POTASSIUM BLD-SCNC: 3.6 MMOL/L (ref 3.4–5.3)
RBC # BLD AUTO: 3.87 10E6/UL (ref 3.8–5.2)
SODIUM SERPL-SCNC: 135 MMOL/L (ref 133–144)
WBC # BLD AUTO: 5.6 10E3/UL (ref 4–11)

## 2021-11-08 PROCEDURE — 76801 OB US < 14 WKS SINGLE FETUS: CPT

## 2021-11-08 PROCEDURE — 84702 CHORIONIC GONADOTROPIN TEST: CPT | Performed by: EMERGENCY MEDICINE

## 2021-11-08 PROCEDURE — 80048 BASIC METABOLIC PNL TOTAL CA: CPT | Performed by: EMERGENCY MEDICINE

## 2021-11-08 PROCEDURE — 99284 EMERGENCY DEPT VISIT MOD MDM: CPT | Mod: 25

## 2021-11-08 PROCEDURE — 36415 COLL VENOUS BLD VENIPUNCTURE: CPT | Performed by: EMERGENCY MEDICINE

## 2021-11-08 PROCEDURE — 85025 COMPLETE CBC W/AUTO DIFF WBC: CPT | Performed by: EMERGENCY MEDICINE

## 2021-11-08 ASSESSMENT — ENCOUNTER SYMPTOMS: NERVOUS/ANXIOUS: 1

## 2021-11-08 ASSESSMENT — MIFFLIN-ST. JEOR: SCORE: 1245.61

## 2021-11-08 NOTE — ED TRIAGE NOTES
"8 weeks pregnant, she has been spotting since Thursday with mild cramping. She noticed some \"tissue\" in the toilet she she went to the bathroom today.   "

## 2021-11-09 ENCOUNTER — TELEPHONE (OUTPATIENT)
Dept: OBGYN | Facility: CLINIC | Age: 36
End: 2021-11-09
Payer: COMMERCIAL

## 2021-11-09 NOTE — ED PROVIDER NOTES
"  History   Chief Complaint:  Vaginal Bleeding       The history is provided by the patient.      Evelyn Strauss is an 8-week pregnant 36 year old female () with blood type B positive and history of back pain who presents with vaginal bleeding. She started spotting on 2021 and has had associated pelvic cramping. The blood is typically present upon wiping, patient has also observed some brownish/red blood in her underwear as well. Patient states that her bleeding becomes heavier when she is more active. She also had vaginal discharge of what she describes at [facility] a few days ago but has had no recurrence of this. After the patient voided today she noticed what appeared to be tissue in the toilet and her concern prompted her to come in for evaluation. She has no other concerns to report, though is feeling quite anxious. The patient denies having any complications with prior pregnancies, both her children were born at [facility].    Review of Systems   Genitourinary: Positive for pelvic pain, vaginal bleeding and vaginal discharge.   Psychiatric/Behavioral: The patient is nervous/anxious.    All other systems reviewed and are negative.    Allergies:  Ceclor [Cefaclor]    Medications:  Docosahexaenoic Acid  Albuterol inhaler    Past Medical History:     Bilateral low back pain with sciatica  Asthma    Past Surgical History:    Dental extraction with suture    Family History:    Mother - hypertension, depression thyroid disease, obesity  Sister - depression, obesity    Social History:  The patient was not accompanied to the ED.  Marital Status:     Physical Exam     Patient Vitals for the past 24 hrs:   BP Temp Pulse Resp SpO2 Height Weight   21 2151 119/79 -- 90 -- -- -- --   21 1612 107/71 98.7  F (37.1  C) 78 18 99 % 1.575 m (5' 2\") 61.2 kg (135 lb)       Physical Exam  Eye:  Pupils are equal, round, and reactive.  Extraocular movements intact.    ENT:  No rhinorrhea.  Moist " mucus membranes.  Normal tongue and tonsil.    Cardiac:  Regular rate and rhythm.  No murmurs, gallops, or rubs.    Pulmonary:  Clear to auscultation bilaterally.  No wheezes, rales, or rhonchi.    Abdomen:  Positive bowel sounds.  Abdomen is soft and non-distended, without focal tenderness.    Musculoskeletal:  Normal movement of all extremities without evidence for deficit.    Skin:  Warm and dry without rashes.    Neurologic:  Non-focal exam without asymmetric weakness or numbness.     Psychiatric:  Normal affect with appropriate interaction with examiner.    Emergency Department Course     Imaging:  US Pelvic Complete with Transvaginal:  1.  Single intrauterine gestation at 6 weeks and 1 day.  2.  No fetal cardiac activity is identified. This could reflect the small size of the fetal pole but is suspicious for a failed early pregnancy. Recommend short interval follow-up with serial beta-hCG levels and/or ultrasound.  3.  Subchorionic hemorrhage measuring 3.0 cm.  Findings Suspicious, But Not Diagnostic of Pregnancy Failure.  Report per radiology.    Laboratory:  CBC: WBC 5.6, HGB 11.2 (L),   BMP: Creatinine 0.50 (L), o/w WNL    HCG quantitative pregnancy blood: 13.001 (H)    Emergency Department Course:  Reviewed:  I reviewed nursing notes, vitals, past medical history and Care Everywhere    Assessments:  2139 I obtained history and examined the patient as noted above. Discussed ultrasound findings.    Disposition:  The patient was discharged to home.     Impression & Plan       Medical Decision Making:  This unfortunate 36-year-old woman presents to us with a probable miscarriage.  She is 8 weeks by dates with a quant of 13,000.  However, pelvic ultrasound shows an IUP closer to 7 weeks with no fetal activity which we should be seen at a quant level this high.  She is now also having cramping and bleeding which at this point is still mild.  Nonetheless, I explained that I feel this is most likely  representative of a miscarriage and this will require further follow-up.  Otherwise, she is B+.  The rest of her labs are reassuring.  I see no other signs of life-threatening pathology and feel she is safe for discharge home with reassurance and outpatient follow-up.  She will otherwise return to us for worsening of condition or other emergent concerns.    Diagnosis:    ICD-10-CM    1. Threatened   O20.0        Discharge Medications:  None.      Scribe Disclosure:  I, Belle Viramontes, am serving as a scribe at 9:39 PM on 2021 to document services personally performed by Trierweiler, Chad A, MD based on my observations and the provider's statements to me.              Trierweiler, Chad A, MD  21 [id number]

## 2021-11-09 NOTE — TELEPHONE ENCOUNTER
Was scheduled for 11/15/21 with Dr. Chaudhry, new prenatal, new pt to clinic.    ED yesterday-SAB noted.   Per chart review B + blood type.     Wondering about follow up-HCG's etc  Evelyn knows to go back to the ED with severe abd pain and/or heavy bleeding saturating a pad in an hour.     Will consult with Dr. Chaudhry and return call to pt Wednesday.  Sendy Alberto RN on 11/9/2021 at 3:11 PM

## 2021-11-10 DIAGNOSIS — O36.80X0 PREGNANCY WITH INCONCLUSIVE FETAL VIABILITY: Primary | ICD-10-CM

## 2021-11-10 NOTE — TELEPHONE ENCOUNTER
left detailed message on patients mobile number with 's recommendations. Let her know to call clinic with further questions.    Selena Le RN

## 2021-11-10 NOTE — TELEPHONE ENCOUNTER
I am sorry to here this news.    I would recommend keeping appointment and US.  Based on ER US, miscarriage has not yet occurred. (Gestational sac still within uterus)  If she has not had completed miscarriage by her appointment then we will need to talk about other management options.    We can repeat hcg that day as well.

## 2021-11-15 ENCOUNTER — PRENATAL OFFICE VISIT (OUTPATIENT)
Dept: OBGYN | Facility: CLINIC | Age: 36
End: 2021-11-15
Payer: COMMERCIAL

## 2021-11-15 ENCOUNTER — ANCILLARY PROCEDURE (OUTPATIENT)
Dept: ULTRASOUND IMAGING | Facility: CLINIC | Age: 36
End: 2021-11-15
Payer: COMMERCIAL

## 2021-11-15 VITALS
HEIGHT: 62 IN | BODY MASS INDEX: 25.4 KG/M2 | SYSTOLIC BLOOD PRESSURE: 98 MMHG | DIASTOLIC BLOOD PRESSURE: 56 MMHG | WEIGHT: 138 LBS

## 2021-11-15 DIAGNOSIS — O03.9 MISCARRIAGE: Primary | ICD-10-CM

## 2021-11-15 DIAGNOSIS — O36.80X0 PREGNANCY WITH INCONCLUSIVE FETAL VIABILITY: ICD-10-CM

## 2021-11-15 LAB — B-HCG SERPL-ACNC: ABNORMAL IU/L (ref 0–5)

## 2021-11-15 PROCEDURE — 76817 TRANSVAGINAL US OBSTETRIC: CPT | Performed by: OBSTETRICS & GYNECOLOGY

## 2021-11-15 PROCEDURE — 99203 OFFICE O/P NEW LOW 30 MIN: CPT | Performed by: OBSTETRICS & GYNECOLOGY

## 2021-11-15 PROCEDURE — 36415 COLL VENOUS BLD VENIPUNCTURE: CPT | Performed by: OBSTETRICS & GYNECOLOGY

## 2021-11-15 PROCEDURE — 84702 CHORIONIC GONADOTROPIN TEST: CPT | Performed by: OBSTETRICS & GYNECOLOGY

## 2021-11-15 RX ORDER — MISOPROSTOL 200 UG/1
800 TABLET ORAL ONCE
Qty: 4 TABLET | Refills: 0 | Status: SHIPPED | OUTPATIENT
Start: 2021-11-15 | End: 2021-11-15

## 2021-11-15 RX ORDER — OXYCODONE HYDROCHLORIDE 5 MG/1
5 TABLET ORAL EVERY 6 HOURS PRN
Qty: 6 TABLET | Refills: 0 | Status: SHIPPED | OUTPATIENT
Start: 2021-11-15 | End: 2021-11-18

## 2021-11-15 RX ORDER — PRENATAL VIT/IRON FUM/FOLIC AC 27MG-0.8MG
1 TABLET ORAL DAILY
COMMUNITY

## 2021-11-15 RX ORDER — ALBUTEROL SULFATE 90 UG/1
AEROSOL, METERED RESPIRATORY (INHALATION)
COMMUNITY
Start: 2020-11-24

## 2021-11-15 ASSESSMENT — MIFFLIN-ST. JEOR: SCORE: 1259.21

## 2021-11-15 NOTE — PROGRESS NOTES
SUBJECTIVE:                                                   Evelyn Strauss is a 36 year old female who presents to clinic today for the following health issue(s):  Patient presents with:  Ultrasound: Viability f/u. Bleeding has gotten heavier since ED visit .    HPI:  Patient is here to follow-up on viability US.   She was evaluated in the ER on .  At that time US non-viable IUP.  Recommendation was for follow-up.  Patient states since that time she has noticed an increase in bleeding.  She did have pelvic US today, which shows a gestational sac with fetal pole.  No cardiac activity is noted.    She is here to discuss options.    Patient's last menstrual period was 09/15/2021.    Patient is sexually active, .  Using none for contraception.    reports that she has never smoked. She has never used smokeless tobacco.    STD testing offered?  Declined    Health maintenance updated:  yes    Today's PHQ-2 Score:   PHQ-2 (  [employer]) 2019   Q1: Little interest or pleasure in doing things 0   Q2: Feeling down, depressed or hopeless 0   PHQ-2 Score 0   Q1: Little interest or pleasure in doing things -   Q2: Feeling down, depressed or hopeless -   PHQ-2 Score -     Today's PHQ-9 Score:   PHQ-9 SCORE 2019   PHQ-9 Total Score 2     Today's NERY-7 Score:   NERY-7 SCORE 2019   Total Score -   Total Score 0       Problem list and histories reviewed & adjusted, as indicated.  Additional history: as documented.    Patient Active Problem List   Diagnosis      labor with  delivery, fetus 1, weekly 17P     Bilateral low back pain with bilateral sciatica     Past Surgical History:   Procedure Laterality Date     C SUTURE DENTAL EXTRACTION SITE        Social History     Tobacco Use     Smoking status: Never Smoker     Smokeless tobacco: Never Used   Substance Use Topics     Alcohol use: No     Alcohol/week: 0.0 standard drinks     Comment: 2 a month tryong to conceive soon. stopped in  "preganncy      Problem (# of Occurrences) Relation (Name,Age of Onset)    Alzheimer Disease (1) Paternal Grandmother    Depression (2) Mother (Grandmother), Sister (Sister)    Diabetes (1) Maternal Grandmother (Grandmother)    Hypertension (1) Mother (Grandmother)    Obesity (2) Mother (Grandmother), Sister (Sister 1)    Thyroid Disease (1) Mother (Grandmother)            Current Outpatient Medications   Medication Sig     albuterol (PROAIR HFA/PROVENTIL HFA/VENTOLIN HFA) 108 (90 Base) MCG/ACT inhaler      Docosahexaenoic Acid (PRENATAL DHA PO) Take 1 tablet by mouth daily     oxyCODONE (ROXICODONE) 5 MG tablet Take 1 tablet (5 mg) by mouth every 6 hours as needed for severe pain     Prenatal Vit-Fe Fumarate-FA (PRENATAL MULTIVITAMIN W/IRON) 27-0.8 MG tablet Take 1 tablet by mouth daily     No current facility-administered medications for this visit.     Allergies   Allergen Reactions     Ceclor [Cefaclor] Rash       ROS:  12 point review of systems negative other than symptoms noted below or in the HPI.  Genitourinary: Spotting  No urinary frequency or dysuria, bladder or kidney problems      OBJECTIVE:     BP 98/56   Ht 1.575 m (5' 2\")   Wt 62.6 kg (138 lb)   LMP 09/15/2021   Breastfeeding No   BMI 25.24 kg/m    Body mass index is 25.24 kg/m .    Exam:  Constitutional:  Appearance: Well nourished, well developed alert, in no acute distress  Psychiatric:  Mentation appears normal and affect normal/bright.     In-Clinic Test Results:  Results for orders placed or performed in visit on 11/15/21 (from the past 24 hour(s))   HCG quantitative pregnancy   Result Value Ref Range    hCG Quantitative 10,059 (H) 0 - 5 IU/L       ASSESSMENT/PLAN:                                                        ICD-10-CM    1. Miscarriage  O03.9 HCG quantitative pregnancy     HCG quantitative pregnancy     misoprostol (CYTOTEC) 200 MCG tablet     oxyCODONE (ROXICODONE) 5 MG tablet       There are no Patient Instructions on file for " this visit.    We reviewed options for management of miscarriage.  We discussed expectant management versus medical management versus surgical management.  We reviewed the risks and benefits of these options.  We discussed that if expectant management or medical management is chosen that we would need to monitor closely for bleeding.  If bleeding was excessive then we would need to proceed with surgical management.  I discussed with patient that I would recommend 1 further week of expectant management as long as she is otherwise stable.  If miscarriage does not occur spontaneously at this point then I would recommend the other treatment options.    We discussed what medical management would look like:      Misoprostol 800 micrograms vaginally.  She could repeat dose in 24 hours if the first dose not successful.    Prescriptions for Oxcodone 5mg 1-2 S2ijuge provided to the patient.     We reviewed expectations regarding bleeding after usage of misoprostol.  If patient is bleeding greater than 2 super pads per hour for more than 2 hr she should go to the emergency department for further evaluation.    Follow-up in 1 week.  Repeat hCG at that appointment.    If misoprostol fails, plan on suction curettage.       Lennie Chaudhry MD  [facility] [place]

## 2021-11-17 ENCOUNTER — HOSPITAL ENCOUNTER (EMERGENCY)
Facility: CLINIC | Age: 36
Discharge: HOME | End: 2021-11-17
Attending: EMERGENCY MEDICINE | Admitting: EMERGENCY MEDICINE
Payer: COMMERCIAL

## 2021-11-17 ENCOUNTER — NURSE TRIAGE (OUTPATIENT)
Dept: NURSING | Facility: CLINIC | Age: 36
End: 2021-11-17
Payer: COMMERCIAL

## 2021-11-17 VITALS
RESPIRATION RATE: 20 BRPM | HEIGHT: 62 IN | BODY MASS INDEX: 24.84 KG/M2 | DIASTOLIC BLOOD PRESSURE: 80 MMHG | SYSTOLIC BLOOD PRESSURE: 110 MMHG | TEMPERATURE: 99 F | HEART RATE: 85 BPM | OXYGEN SATURATION: 98 % | WEIGHT: 135 LBS

## 2021-11-17 DIAGNOSIS — N93.9 VAGINAL BLEEDING: ICD-10-CM

## 2021-11-17 DIAGNOSIS — O03.4 INCOMPLETE MISCARRIAGE: ICD-10-CM

## 2021-11-17 LAB
ALBUMIN SERPL-MCNC: 3.4 G/DL (ref 3.4–5)
ALP SERPL-CCNC: 71 U/L (ref 40–150)
ALT SERPL W P-5'-P-CCNC: 31 U/L (ref 0–50)
ANION GAP SERPL CALCULATED.3IONS-SCNC: 9 MMOL/L (ref 3–14)
AST SERPL W P-5'-P-CCNC: 18 U/L (ref 0–45)
B-HCG SERPL-ACNC: 1615 IU/L (ref 0–5)
BASOPHILS # BLD AUTO: 0 10E3/UL (ref 0–0.2)
BASOPHILS NFR BLD AUTO: 0 %
BILIRUB SERPL-MCNC: 0.5 MG/DL (ref 0.2–1.3)
BUN SERPL-MCNC: 6 MG/DL (ref 7–30)
CALCIUM SERPL-MCNC: 8.5 MG/DL (ref 8.5–10.1)
CHLORIDE BLD-SCNC: 108 MMOL/L (ref 94–109)
CO2 SERPL-SCNC: 24 MMOL/L (ref 20–32)
CREAT SERPL-MCNC: 0.58 MG/DL (ref 0.52–1.04)
EOSINOPHIL # BLD AUTO: 0.1 10E3/UL (ref 0–0.7)
EOSINOPHIL NFR BLD AUTO: 1 %
ERYTHROCYTE [DISTWIDTH] IN BLOOD BY AUTOMATED COUNT: 12.7 % (ref 10–15)
GFR SERPL CREATININE-BSD FRML MDRD: >90 ML/MIN/1.73M2
GLUCOSE BLD-MCNC: 92 MG/DL (ref 70–99)
HCT VFR BLD AUTO: 30.6 % (ref 35–47)
HGB BLD-MCNC: 10.4 G/DL (ref 11.7–15.7)
HOLD SPECIMEN: NORMAL
IMM GRANULOCYTES # BLD: 0 10E3/UL
IMM GRANULOCYTES NFR BLD: 0 %
LYMPHOCYTES # BLD AUTO: 2.2 10E3/UL (ref 0.8–5.3)
LYMPHOCYTES NFR BLD AUTO: 31 %
MCH RBC QN AUTO: 29.1 PG (ref 26.5–33)
MCHC RBC AUTO-ENTMCNC: 34 G/DL (ref 31.5–36.5)
MCV RBC AUTO: 86 FL (ref 78–100)
MONOCYTES # BLD AUTO: 0.5 10E3/UL (ref 0–1.3)
MONOCYTES NFR BLD AUTO: 8 %
NEUTROPHILS # BLD AUTO: 4.1 10E3/UL (ref 1.6–8.3)
NEUTROPHILS NFR BLD AUTO: 60 %
NRBC # BLD AUTO: 0 10E3/UL
NRBC BLD AUTO-RTO: 0 /100
PLATELET # BLD AUTO: 192 10E3/UL (ref 150–450)
POTASSIUM BLD-SCNC: 3.7 MMOL/L (ref 3.4–5.3)
PROT SERPL-MCNC: 7.1 G/DL (ref 6.8–8.8)
RBC # BLD AUTO: 3.57 10E6/UL (ref 3.8–5.2)
SODIUM SERPL-SCNC: 141 MMOL/L (ref 133–144)
WBC # BLD AUTO: 7 10E3/UL (ref 4–11)

## 2021-11-17 PROCEDURE — 99284 EMERGENCY DEPT VISIT MOD MDM: CPT | Mod: 25

## 2021-11-17 PROCEDURE — 36415 COLL VENOUS BLD VENIPUNCTURE: CPT | Performed by: EMERGENCY MEDICINE

## 2021-11-17 PROCEDURE — 85025 COMPLETE CBC W/AUTO DIFF WBC: CPT | Performed by: EMERGENCY MEDICINE

## 2021-11-17 PROCEDURE — 84702 CHORIONIC GONADOTROPIN TEST: CPT | Performed by: EMERGENCY MEDICINE

## 2021-11-17 PROCEDURE — 82040 ASSAY OF SERUM ALBUMIN: CPT | Performed by: EMERGENCY MEDICINE

## 2021-11-17 RX ORDER — METHYLERGONOVINE MALEATE 0.2 MG/1
0.2 TABLET ORAL EVERY 6 HOURS
Qty: 8 TABLET | Refills: 0 | Status: SHIPPED | OUTPATIENT
Start: 2021-11-17 | End: 2021-11-19

## 2021-11-17 ASSESSMENT — MIFFLIN-ST. JEOR: SCORE: 1245.61

## 2021-11-17 ASSESSMENT — ENCOUNTER SYMPTOMS
DIARRHEA: 0
VOMITING: 0

## 2021-11-17 NOTE — ED PROVIDER NOTES
"  History     Chief Complaint:  Vaginal Bleeding    The history is provided by the patient.      Evelyn Strauss is a 36 year old female with history of asthma who presents with increased pelvic cramping and vaginal bleeding for the past few days. She had a pelvic ultrasound 2 days ago and was found to have a non-viable IUP after 8 weeks of pregnancy. She was given a prescription for misoprostol but did not pick this up as she was waiting to miscarry spontaneously. Yesterday morning at approximately 0430, she woke up with heavy vaginal bleeding. Since then, she has been having heavy vaginal bleeding and pelvic cramping which has been worsening. The pelvic pain has been waxing and waning in severity. She took ibuprofen and Tylenol last night as well as Tylenol 3 hours ago. Over the past four hours, she reports that she has soaked through 3 pads. Evelyn called her OBGYN clinic and spoke with a nurse who advised that she visit the ED. She denies vomiting, diarrhea, syncope.    Review of Systems   Gastrointestinal: Negative for diarrhea and vomiting.   Genitourinary: Positive for pelvic pain and vaginal bleeding.   Neurological: Negative for syncope.   All other systems reviewed and are negative.    Allergies:  Cefaclor    Medications:  Albuterol  Prenatal vitamin  Roxicodone    Past Medical History:    Asthma     Past Surgical History:    Dental extraction    Family History:    Mother: hypertension, depression, thyroid disease, obesity  Sister: depression, obesity    Social History:  Patient presents to the ED alone.  Patient has children.    Physical Exam     Patient Vitals for the past 24 hrs:   BP Temp Pulse Resp SpO2 Height Weight   11/17/21 1138 110/80 -- 85 20 98 % -- --   11/17/21 0945 111/72 99  F (37.2  C) 91 20 99 % 1.575 m (5' 2\") 61.2 kg (135 lb)     Physical Exam  Vitals: reviewed by me  General: Pt seen on [facility], pleasant, cooperative, and alert to conversation  Eyes: Tracking well, clear " conjunctiva BL  ENT: MMM, midline trachea.   Lungs: No tachypnea, no accessory muscle use. No respiratory distress.   CV: Rate as above  Abd: Soft, non tender, no guarding, no rebound. Non distended  Bimanual pelvic exam done with ED tech chaperone.  No adnexal tenderness, os feels closed, mild amount of venous pattern bleeding noted, no vaginal hemorrhage, no lesions or tissues felt or seen.  MSK: no joint effusion.  No evidence of trauma  Skin: No rash  Neuro: Clear speech and no facial droop.  Psych: Not RIS, no e/o AH/VH      Emergency Department Course     Laboratory:  Labs Ordered and Resulted from Time of ED Arrival to Time of ED Departure   COMPREHENSIVE METABOLIC PANEL - Abnormal       Result Value    Sodium 141      Potassium 3.7      Chloride 108      Carbon Dioxide (CO2) 24      Anion Gap 9      Urea Nitrogen 6 (*)     Creatinine 0.58      Calcium 8.5      Glucose 92      Alkaline Phosphatase 71      AST 18      ALT 31      Protein Total 7.1      Albumin 3.4      Bilirubin Total 0.5      GFR Estimate >90     HCG QUANTITATIVE PREGNANCY - Abnormal    hCG Quantitative 1,615 (*)    CBC WITH PLATELETS AND DIFFERENTIAL - Abnormal    WBC Count 7.0      RBC Count 3.57 (*)     Hemoglobin 10.4 (*)     Hematocrit 30.6 (*)     MCV 86      MCH 29.1      MCHC 34.0      RDW 12.7      Platelet Count 192      % Neutrophils 60      % Lymphocytes 31      % Monocytes 8      % Eosinophils 1      % Basophils 0      % Immature Granulocytes 0      NRBCs per 100 WBC 0      Absolute Neutrophils 4.1      Absolute Lymphocytes 2.2      Absolute Monocytes 0.5      Absolute Eosinophils 0.1      Absolute Basophils 0.0      Absolute Immature Granulocytes 0.0      Absolute NRBCs 0.0       Emergency Department Course:    Reviewed:  I reviewed nursing notes, vitals, past medical history, care everywhere    Assessments:  0958 I obtained history and examined the patient as noted above.   1125 I rechecked the patient and explained findings.      Consults:   1116 I consulted Dr. Lennie Chaudhry, the patient's OBGYN, regarding the her presentation and plan of care.     Disposition:  The patient was discharged to home.     Impression & Plan     Medical Decision Making:  Evelyn Strauss is a 36 year old female who presents to the emergency department with increasing vaginal bleeding during a known miscarriage.  She tells me she never took misoprostol, and started passing would look like fetal tissue prior to arriving.  Her main concern is that her bleeding continues and is actually slightly increased.  She is changing her pads roughly every 90 minutes, and wanted to get this looked at.  On my exam, there is a mild amount of venous pattern bleeding, but she otherwise has a normal hemoglobin, a downtrending beta hCG, and normal vital signs.  I spoke with her OB/GYN, Dr. Lennie Chaudhry, who advised Methergine, and close outpatient follow-up.  We went over return to ED precautions which included if she had to change her pads 2 or more times per hour for 2 hours or more, and the patient was amenable to this.  We talked about suction curettage, and how this is not indicated at this time, per Dr. Chaudhry.  Patient feels comfortable with her management, all of her questions were answered, and she was discharged with the above plan.    Diagnosis:    ICD-10-CM    1. Vaginal bleeding  N93.9    2. Incomplete miscarriage  O03.4      Discharge Medications:  Discharge Medication List as of 11/17/2021 11:27 AM      START taking these medications    Details   methylergonovine (METHERGINE) 0.2 MG tablet Take 1 tablet (200 mcg) by mouth every 6 hours for 2 days, Disp-8 tablet, R-0, Local Print           Scribe Disclosure:  I, Araceli Arias, am serving as a scribe at 9:58 AM on 11/17/2021 to document services personally performed by Quan Montoya MD based on my observations and the provider's statements to me.       Quan Montoya MD  11/17/21 [id number]

## 2021-11-17 NOTE — DISCHARGE INSTRUCTIONS
As we discussed, it does seem as though you have passed the majority of your pregnancy, and some bleeding is still normal.  Please be in touch with Dr. Chaudhry tomorrow, and come back to the ER immediately if you have bleeding that causes you to change your pad more than twice per hour for more than 2 hours.  That means in your fifth pad in 4 hours, you need to come back to the ER.  Please take the medication we have given you as well.

## 2021-11-17 NOTE — TELEPHONE ENCOUNTER
Patient is currently 8 weeks pregnant and PCP is Lennie Chaudhry from the [facility].  Patient was seen in clinic on 11/15/2021.    Evelyn is calling and states that yesterday miscarried in the morning and cramping yesterday.  As the day progressed is worse.  Today cramping is severe.  Today patient is bleeding vaginally.  Patient is currently 8 weeks pregnant and states that abdominal pain today is severe and cannot get out of bed and patient is soaking through two pads already this am.    COVID 19 Nurse Triage Plan/Patient Instructions    Please be aware that novel coronavirus (COVID-19) may be circulating in the community. If you develop symptoms such as fever, cough, or SOB or if you have concerns about the presence of another infection including coronavirus (COVID-19), please contact your health care provider or visit [email or web address].     Disposition/Instructions    ED Visit recommended. Follow protocol based instructions.     Bring Your Own Device:  Please also bring your smart device(s) (smart phones, tablets, laptops) and their charging cables for your personal use and to communicate with your care team during your visit.    Thank you for taking steps to prevent the spread of this virus.  o Limit your contact with others.  o Wear a simple mask to cover your cough.  o Wash your hands well and often.    Resources    [facility]: About COVID-19: [email or web address]    CDC: What to Do If You're Sick: www.cdc.gov/coronavirus/2019-ncov/about/steps-when-sick.html    CDC: Ending Home Isolation: www.cdc.gov/coronavirus/2019-ncov/hcp/disposition-in-home-patients.html     CDC: Caring for Someone: www.cdc.gov/coronavirus/2019-ncov/if-you-are-sick/care-for-someone.html     [facility]: Interim Guidance for Hospital Discharge to Home: www.health.[facility].mn.us/diseases/coronavirus/hcp/hospdischarge.pdf    [facility] clinical trials (COVID-19 research studies):  clinicalaffairs.[facility]-clinical-trials     Below are the COVID-19 hotlines at the [place] Department of Health ([facility]). Interpreters are available.   o For health questions: Call 875-366-8252 or 1-258.748.6566 (7 a.m. to 7 p.m.)  o For questions about schools and childcare: Call 605-836-2625 or 1-324.542.3307 (7 a.m. to 7 p.m.)                       Reason for Disposition    SEVERE abdominal pain    Additional Information    Negative: Shock suspected (e.g., cold/pale/clammy skin, too weak to stand, low BP, rapid pulse)    Negative: Difficult to awaken or acting confused (e.g., disoriented, slurred speech)    Negative: Passed out (i.e., lost consciousness, collapsed and was not responding)    Negative: Sounds like a life-threatening emergency to the triager    Protocols used: PREGNANCY - VAGINAL BLEEDING LESS THAN 20 WEEKS EGA-A-

## 2021-11-19 PROBLEM — M54.42 BILATERAL LOW BACK PAIN WITH BILATERAL SCIATICA: Status: RESOLVED | Noted: 2020-11-27 | Resolved: 2021-11-19

## 2021-11-19 PROBLEM — M54.41 BILATERAL LOW BACK PAIN WITH BILATERAL SCIATICA: Status: RESOLVED | Noted: 2020-11-27 | Resolved: 2021-11-19

## 2021-11-19 NOTE — PROGRESS NOTES
SUBJECTIVE:                                                   Evelyn Strauss is a 36 year old female who presents to clinic today for the following health issue(s):  Patient presents with:  Ultrasound: follow-up      HPI:  Patient is here for miscarriage follow-up.  She was seen in the ER last week due to heavy bleeding consistent with miscarriage.  Quant hcg was done, which showed significant decrease from 2 days prior.  She was given methergine and instructed to follow-up.  She reports her bleeding is minimal at this time.  She denies any cramping or pelvic pain.  No foul smelling discharge.  She reports no other concerns.    Patient's last menstrual period was 09/15/2021..   Patient is sexually active, .  Using nothing for contraception, recent pregnancy.   reports that she has never smoked. She has never used smokeless tobacco.  Health maintenance updated:  Due for pap    Today's PHQ-2 Score:   PHQ-2 (  [employer]) 2019   Q1: Little interest or pleasure in doing things 0   Q2: Feeling down, depressed or hopeless 0   PHQ-2 Score 0   PHQ-2 Total Score (12-17 Years)- Positive if 3 or more points; Administer PHQ-A if positive 0   Q1: Little interest or pleasure in doing things -   Q2: Feeling down, depressed or hopeless -   PHQ-2 Score -     Today's PHQ-9 Score:   PHQ-9 SCORE 2019   PHQ-9 Total Score 2     Today's NERY-7 Score:   NERY-7 SCORE 2019   Total Score -   Total Score 0       Problem list and histories reviewed & adjusted, as indicated.  Additional history: as documented.    Patient Active Problem List   Diagnosis      labor with  delivery, fetus 1, weekly 17P     Past Surgical History:   Procedure Laterality Date     C SUTURE DENTAL EXTRACTION SITE        Social History     Tobacco Use     Smoking status: Never Smoker     Smokeless tobacco: Never Used   Substance Use Topics     Alcohol use: No     Alcohol/week: 0.0 standard drinks     Comment: 2 a month tryong to conceive  "soon. stopped in preganncy      Problem (# of Occurrences) Relation (Name,Age of Onset)    Alzheimer Disease (1) Paternal Grandmother    Depression (2) Mother (Grandmother), Sister (Sister)    Diabetes (1) Maternal Grandmother (Grandmother)    Hypertension (1) Mother (Grandmother)    Obesity (2) Mother (Grandmother), Sister (Sister 1)    Thyroid Disease (1) Mother (Grandmother)            Current Outpatient Medications   Medication Sig     albuterol (PROAIR HFA/PROVENTIL HFA/VENTOLIN HFA) 108 (90 Base) MCG/ACT inhaler      Docosahexaenoic Acid (PRENATAL DHA PO) Take 1 tablet by mouth daily     Prenatal Vit-Fe Fumarate-FA (PRENATAL MULTIVITAMIN W/IRON) 27-0.8 MG tablet Take 1 tablet by mouth daily     No current facility-administered medications for this visit.     Allergies   Allergen Reactions     Ceclor [Cefaclor] Rash       ROS:  12 point review of systems negative other than symptoms noted below or in the HPI.  No urinary frequency or dysuria, bladder or kidney problems      OBJECTIVE:     /62   Ht 1.575 m (5' 2\")   Wt 62.1 kg (137 lb)   LMP 09/15/2021   BMI 25.06 kg/m    Body mass index is 25.06 kg/m .    Exam:  Constitutional:  Appearance: Well nourished, well developed alert, in no acute distress  Psychiatric:  Mentation appears normal and affect normal/bright.     In-Clinic Test Results:  Results for orders placed or performed in visit on 11/22/21 (from the past 24 hour(s))   HCG quantitative pregnancy   Result Value Ref Range    hCG Quantitative 192 (H) 0 - 5 IU/L       ASSESSMENT/PLAN:                                                        ICD-10-CM    1. Miscarriage  O03.9 HCG quantitative pregnancy     HCG quantitative pregnancy     HCG quantitative pregnancy       Rechecked hcg today.  Plan to follow-up in 1 week to ensure returns to negative.  Reviewed US report, plan to closely monitor for any change/worsening of bleeding  Follow-up as needed.  Recommend on normal cycle before attempting to " conceive again.    Lennie Chaudhry MD  [facility] [place]

## 2021-11-22 ENCOUNTER — OFFICE VISIT (OUTPATIENT)
Dept: OBGYN | Facility: CLINIC | Age: 36
End: 2021-11-22
Payer: COMMERCIAL

## 2021-11-22 ENCOUNTER — ANCILLARY PROCEDURE (OUTPATIENT)
Dept: ULTRASOUND IMAGING | Facility: CLINIC | Age: 36
End: 2021-11-22
Payer: COMMERCIAL

## 2021-11-22 VITALS
DIASTOLIC BLOOD PRESSURE: 62 MMHG | HEIGHT: 62 IN | BODY MASS INDEX: 25.21 KG/M2 | WEIGHT: 137 LBS | SYSTOLIC BLOOD PRESSURE: 102 MMHG

## 2021-11-22 DIAGNOSIS — O03.9 MISCARRIAGE: ICD-10-CM

## 2021-11-22 DIAGNOSIS — O03.9 MISCARRIAGE: Primary | ICD-10-CM

## 2021-11-22 LAB — B-HCG SERPL-ACNC: 192 IU/L (ref 0–5)

## 2021-11-22 PROCEDURE — 76830 TRANSVAGINAL US NON-OB: CPT | Performed by: OBSTETRICS & GYNECOLOGY

## 2021-11-22 PROCEDURE — 36415 COLL VENOUS BLD VENIPUNCTURE: CPT | Performed by: OBSTETRICS & GYNECOLOGY

## 2021-11-22 PROCEDURE — 99213 OFFICE O/P EST LOW 20 MIN: CPT | Performed by: OBSTETRICS & GYNECOLOGY

## 2021-11-22 PROCEDURE — 76856 US EXAM PELVIC COMPLETE: CPT | Performed by: OBSTETRICS & GYNECOLOGY

## 2021-11-22 PROCEDURE — 84702 CHORIONIC GONADOTROPIN TEST: CPT | Performed by: OBSTETRICS & GYNECOLOGY

## 2021-11-22 ASSESSMENT — MIFFLIN-ST. JEOR: SCORE: 1254.68

## 2021-12-22 ENCOUNTER — NURSE TRIAGE (OUTPATIENT)
Dept: NURSING | Facility: CLINIC | Age: 36
End: 2021-12-22
Payer: COMMERCIAL

## 2021-12-22 NOTE — TELEPHONE ENCOUNTER
"Triage Call:    Patient is calling as she has been having symptoms since Thanksgiving and \"they have gotten worse\".    Cough- productive, nasal congestion that is bright yellow.  She also reports a headache that will not go away with OTC meds.  She is also reporting \"tired all the time\".  She is also reporting shortness of breath even just sitting down and it has been getting worse.    Pt was advised of protocol recommendation/disposition of ED.  She is home alone with 2 kids and doesn't have childcare andi liable.  She is requesting to schedule an appointment.  Transferred her to scheduling line, but also stressed the reasons why she needs to be seen urgently and that she could also be seen in Urgent Care.  Alerted her that they may still refer her ot the ED, but someone needs to assess her very soon.  She verbalized understanding.       Ibis Schmidt RN on 12/22/2021 at 5:26 PM        COVID 19 Nurse Triage Plan/Patient Instructions    Please be aware that novel coronavirus (COVID-19) may be circulating in the community. If you develop symptoms such as fever, cough, or SOB or if you have concerns about the presence of another infection including coronavirus (COVID-19), please contact your health care provider or visit [email or web address].     Disposition/Instructions    ED Visit recommended. Follow protocol based instructions.     Bring Your Own Device:  Please also bring your smart device(s) (smart phones, tablets, laptops) and their charging cables for your personal use and to communicate with your care team during your visit.    Thank you for taking steps to prevent the spread of this virus.  o Limit your contact with others.  o Wear a simple mask to cover your cough.  o Wash your hands well and often.    Resources    [facility]: About COVID-19: [email or web address]    CDC: What to Do If You're Sick: www.cdc.gov/coronavirus/2019-ncov/about/steps-when-sick.html    CDC: Ending Home Isolation: " www.cdc.gov/coronavirus/2019-ncov/hcp/disposition-in-home-patients.html     CDC: Caring for Someone: www.cdc.gov/coronavirus/2019-ncov/if-you-are-sick/care-for-someone.html     [facility]: Interim Guidance for Hospital Discharge to Home: www.health.[facility].mn.us/diseases/coronavirus/hcp/hospdischarge.pdf    [facility] clinical trials (COVID-19 research studies): clinicalaffairs.[facility]-clinical-trials     Below are the COVID-19 hotlines at the [place] Department of Health ([facility]). Interpreters are available.   o For health questions: Call 244-189-3770 or 1-598.992.8789 (7 a.m. to 7 p.m.)  o For questions about schools and childcare: Call 535-669-7163 or 1-992.648.6909 (7 a.m. to 7 p.m.)                   Reason for Disposition    [1] MODERATE difficulty breathing (e.g., speaks in phrases, SOB even at rest, pulse 100-120) AND [2] NEW-onset or WORSE than normal    Additional Information    Negative: [1] Breathing stopped AND [2] hasn't returned    Negative: Choking on something    Negative: Severe difficulty breathing (e.g., struggling for each breath, speaks in single words)    Negative: Bluish (or gray) lips or face now    Negative: Difficult to awaken or acting confused (e.g., disoriented, slurred speech)    Negative: Passed out (i.e., lost consciousness, collapsed and was not responding)    Negative: Wheezing started suddenly after medicine, an allergic food or bee sting    Negative: Stridor    Negative: Slow, shallow and weak breathing    Negative: Sounds like a life-threatening emergency to the triager    Negative: Chest pain    Negative: [1] Wheezing (high pitched whistling sound) AND [2] previous asthma attacks or use of asthma medicines    Negative: [1] Difficulty breathing AND [2] only present when coughing    Negative: [1] Difficulty breathing AND [2] only from stuffy or runny nose    Negative: [1] Difficulty breathing AND [2] within 14 days of COVID-19 Exposure    Protocols used: BREATHING  DIFFICULTY-A-AH

## 2021-12-24 ENCOUNTER — ANCILLARY PROCEDURE (OUTPATIENT)
Dept: GENERAL RADIOLOGY | Facility: CLINIC | Age: 36
End: 2021-12-24
Attending: INTERNAL MEDICINE
Payer: COMMERCIAL

## 2021-12-24 ENCOUNTER — OFFICE VISIT (OUTPATIENT)
Dept: INTERNAL MEDICINE | Facility: CLINIC | Age: 36
End: 2021-12-24
Payer: COMMERCIAL

## 2021-12-24 VITALS
SYSTOLIC BLOOD PRESSURE: 126 MMHG | HEART RATE: 88 BPM | WEIGHT: 139.1 LBS | BODY MASS INDEX: 25.44 KG/M2 | DIASTOLIC BLOOD PRESSURE: 79 MMHG | OXYGEN SATURATION: 95 %

## 2021-12-24 DIAGNOSIS — N93.9 VAGINAL BLEEDING: ICD-10-CM

## 2021-12-24 DIAGNOSIS — R53.83 FATIGUE, UNSPECIFIED TYPE: ICD-10-CM

## 2021-12-24 DIAGNOSIS — J45.21 MILD INTERMITTENT ASTHMA WITH ACUTE EXACERBATION: ICD-10-CM

## 2021-12-24 DIAGNOSIS — R05.9 COUGH: ICD-10-CM

## 2021-12-24 DIAGNOSIS — R05.9 COUGH: Primary | ICD-10-CM

## 2021-12-24 LAB
ANION GAP SERPL CALCULATED.3IONS-SCNC: 7 MMOL/L (ref 5–18)
BASOPHILS # BLD AUTO: 0 10E3/UL (ref 0–0.2)
BASOPHILS NFR BLD AUTO: 0 %
BUN SERPL-MCNC: 7 MG/DL (ref 8–22)
CALCIUM SERPL-MCNC: 9.1 MG/DL (ref 8.5–10.5)
CHLORIDE BLD-SCNC: 105 MMOL/L (ref 98–107)
CO2 SERPL-SCNC: 25 MMOL/L (ref 22–31)
CREAT SERPL-MCNC: 0.61 MG/DL (ref 0.6–1.1)
EOSINOPHIL # BLD AUTO: 0.1 10E3/UL (ref 0–0.7)
EOSINOPHIL NFR BLD AUTO: 2 %
ERYTHROCYTE [DISTWIDTH] IN BLOOD BY AUTOMATED COUNT: 12.7 % (ref 10–15)
GFR SERPL CREATININE-BSD FRML MDRD: >90 ML/MIN/1.73M2
GLUCOSE BLD-MCNC: 79 MG/DL (ref 70–125)
HCG SERPL-ACNC: 6 MLU/ML (ref 0–4)
HCT VFR BLD AUTO: 35.7 % (ref 35–47)
HGB BLD-MCNC: 11.8 G/DL (ref 11.7–15.7)
IMM GRANULOCYTES # BLD: 0 10E3/UL
IMM GRANULOCYTES NFR BLD: 0 %
LYMPHOCYTES # BLD AUTO: 1.6 10E3/UL (ref 0.8–5.3)
LYMPHOCYTES NFR BLD AUTO: 33 %
MCH RBC QN AUTO: 28.8 PG (ref 26.5–33)
MCHC RBC AUTO-ENTMCNC: 33.1 G/DL (ref 31.5–36.5)
MCV RBC AUTO: 87 FL (ref 78–100)
MONOCYTES # BLD AUTO: 0.5 10E3/UL (ref 0–1.3)
MONOCYTES NFR BLD AUTO: 10 %
NEUTROPHILS # BLD AUTO: 2.7 10E3/UL (ref 1.6–8.3)
NEUTROPHILS NFR BLD AUTO: 54 %
PLATELET # BLD AUTO: 249 10E3/UL (ref 150–450)
POTASSIUM BLD-SCNC: 4.2 MMOL/L (ref 3.5–5)
RBC # BLD AUTO: 4.1 10E6/UL (ref 3.8–5.2)
SODIUM SERPL-SCNC: 137 MMOL/L (ref 136–145)
TSH SERPL DL<=0.005 MIU/L-ACNC: 1.4 UIU/ML (ref 0.3–5)
WBC # BLD AUTO: 4.9 10E3/UL (ref 4–11)

## 2021-12-24 PROCEDURE — 84702 CHORIONIC GONADOTROPIN TEST: CPT | Performed by: INTERNAL MEDICINE

## 2021-12-24 PROCEDURE — 80048 BASIC METABOLIC PNL TOTAL CA: CPT | Performed by: INTERNAL MEDICINE

## 2021-12-24 PROCEDURE — 71046 X-RAY EXAM CHEST 2 VIEWS: CPT | Mod: TC | Performed by: RADIOLOGY

## 2021-12-24 PROCEDURE — 36415 COLL VENOUS BLD VENIPUNCTURE: CPT | Performed by: INTERNAL MEDICINE

## 2021-12-24 PROCEDURE — 85025 COMPLETE CBC W/AUTO DIFF WBC: CPT | Performed by: INTERNAL MEDICINE

## 2021-12-24 PROCEDURE — 84443 ASSAY THYROID STIM HORMONE: CPT | Performed by: INTERNAL MEDICINE

## 2021-12-24 PROCEDURE — 99204 OFFICE O/P NEW MOD 45 MIN: CPT | Performed by: INTERNAL MEDICINE

## 2021-12-24 RX ORDER — AZITHROMYCIN 250 MG/1
TABLET, FILM COATED ORAL
Qty: 6 TABLET | Refills: 0 | Status: SHIPPED | OUTPATIENT
Start: 2021-12-24

## 2021-12-24 RX ORDER — METHYLPREDNISOLONE 4 MG
TABLET, DOSE PACK ORAL
Qty: 21 TABLET | Refills: 0 | Status: SHIPPED | OUTPATIENT
Start: 2021-12-24

## 2021-12-24 NOTE — PATIENT INSTRUCTIONS
1. If vaginal spotting continues, contact your Gyn.    2. Get covid booster once feeling better    3. Will check blood counts today.    4. Start and medrol dose pack and z-pack , use albuterol 2-3 times a days for chest tightness symptoms.  Will check chest Xr today. Can use over the counter nasal steroid spray: Flonase or nasonex for nasal drainage.

## 2021-12-26 ENCOUNTER — TELEPHONE (OUTPATIENT)
Dept: INTERNAL MEDICINE | Facility: CLINIC | Age: 36
End: 2021-12-26
Payer: COMMERCIAL

## 2021-12-26 DIAGNOSIS — N92.6 IRREGULAR MENSES: Primary | ICD-10-CM

## 2021-12-27 ENCOUNTER — TELEPHONE (OUTPATIENT)
Dept: OBGYN | Facility: CLINIC | Age: 36
End: 2021-12-27
Payer: COMMERCIAL

## 2021-12-27 DIAGNOSIS — R79.89 ELEVATED SERUM HCG: Primary | ICD-10-CM

## 2021-12-27 DIAGNOSIS — O03.9 MISCARRIAGE: ICD-10-CM

## 2021-12-27 NOTE — TELEPHONE ENCOUNTER
Pt advised with response below. Pt scheduled for HCG on Friday morning.  Pt verbalized understanding, in agreement with plan, and voiced no further questions.  Ofelia Olmos RN on 12/27/2021 at 11:36 AM

## 2021-12-27 NOTE — TELEPHONE ENCOUNTER
Yes I think would could repeat hcg later this week, but it will likely be negative.  Her hcgs were trending down from the miscarriage and I think likely the 6 was residual from miscarriage vs new pregnancy.    Bleeding/cramping could be her period as well.    Has she taken anything for cramping?

## 2021-12-27 NOTE — TELEPHONE ENCOUNTER
Please let pt know,    Thyroid level is good.  Pregnancy blood test was tiny bit elevated. Please repeat levels again in 1 week and follow up with a Gyn.  Kidney function, sugar and red cell counts are normal.  Chest XR is negative for pneumonia. I hope she is feeling better  Dr REZA

## 2021-12-27 NOTE — TELEPHONE ENCOUNTER
Recent Miscarriage started on 11/12/21   Saw her PCP on Friday 12/24/21-has been sick for other reasons  Reports LMP:  12/10/21 (2w3d)  Spotting started on Wednesday 12/22/21  Seen in PCP office 12/24 and HCG 6    Cramping since Saturday and increase in bleeding:   Bright red blood on all over toilet paper, cramping non stop since Saturday  Moving to [facility] next month - insurance ends next week - 1/1/22    Asking how to proceed as she thought 6 meant very early pregnancy yet bleeding and cramping similar to her miscarriage in November.    Repeat HCG this week? When do you suggest? Or how do you advise?    Ofelia Olmos RN on 12/27/2021 at 11:11 AM

## 2021-12-31 ENCOUNTER — LAB (OUTPATIENT)
Dept: LAB | Facility: CLINIC | Age: 36
End: 2021-12-31
Payer: COMMERCIAL

## 2021-12-31 DIAGNOSIS — R79.89 ELEVATED SERUM HCG: ICD-10-CM

## 2021-12-31 DIAGNOSIS — O03.9 MISCARRIAGE: ICD-10-CM

## 2021-12-31 LAB — B-HCG SERPL-ACNC: 2 IU/L (ref 0–5)

## 2021-12-31 PROCEDURE — 36415 COLL VENOUS BLD VENIPUNCTURE: CPT

## 2021-12-31 PROCEDURE — 84702 CHORIONIC GONADOTROPIN TEST: CPT

## 2022-01-24 ENCOUNTER — ALLIED HEALTH/NURSE VISIT (OUTPATIENT)
Dept: FAMILY MEDICINE | Facility: CLINIC | Age: 37
End: 2022-01-24
Payer: COMMERCIAL

## 2022-01-24 DIAGNOSIS — Z11.1 SCREENING EXAMINATION FOR PULMONARY TUBERCULOSIS: Primary | ICD-10-CM

## 2022-01-24 PROCEDURE — 86580 TB INTRADERMAL TEST: CPT

## 2022-01-24 PROCEDURE — 99207 PR NO CHARGE NURSE ONLY: CPT

## 2022-01-24 NOTE — PROGRESS NOTES
Patient is here today for a Mantoux (TST) test placement.    Is there a current order in the chart? Yes    Reason for Mantoux (TST) in patient's own words: work    Patient needs form signed? No - form not needed per patient.    Instructed patient to wait for 15 minutes post injection and to report any reactions immediately to staff.    Told patient to return to clinic in 48-72 hours to have Mantoux (TST) read.

## 2022-01-27 ENCOUNTER — ALLIED HEALTH/NURSE VISIT (OUTPATIENT)
Dept: NURSING | Facility: CLINIC | Age: 37
End: 2022-01-27
Payer: COMMERCIAL

## 2022-01-27 DIAGNOSIS — Z11.1 SCREENING EXAMINATION FOR PULMONARY TUBERCULOSIS: Primary | ICD-10-CM

## 2022-01-27 LAB
PPDINDURATION: 0 MM (ref 0–4.99)
PPDREDNESS: NORMAL

## 2022-01-27 PROCEDURE — 99207 PR NO CHARGE NURSE ONLY: CPT

## 2022-05-15 ENCOUNTER — HEALTH MAINTENANCE LETTER (OUTPATIENT)
Age: 37
End: 2022-05-15

## 2022-09-11 ENCOUNTER — HEALTH MAINTENANCE LETTER (OUTPATIENT)
Age: 37
End: 2022-09-11

## 2023-06-03 ENCOUNTER — HEALTH MAINTENANCE LETTER (OUTPATIENT)
Age: 38
End: 2023-06-03

## 2024-02-24 ENCOUNTER — HEALTH MAINTENANCE LETTER (OUTPATIENT)
Age: 39
End: 2024-02-24

## 2024-07-13 ENCOUNTER — HEALTH MAINTENANCE LETTER (OUTPATIENT)
Age: 39
End: 2024-07-13
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
